# Patient Record
Sex: FEMALE | Race: WHITE | NOT HISPANIC OR LATINO | Employment: FULL TIME | URBAN - METROPOLITAN AREA
[De-identification: names, ages, dates, MRNs, and addresses within clinical notes are randomized per-mention and may not be internally consistent; named-entity substitution may affect disease eponyms.]

---

## 2019-07-19 ENCOUNTER — HOSPITAL ENCOUNTER (OUTPATIENT)
Dept: ULTRASOUND IMAGING | Facility: HOSPITAL | Age: 54
Discharge: HOME | End: 2019-07-19

## 2019-09-10 ENCOUNTER — OFFICE VISIT CONVERTED (OUTPATIENT)
Dept: SURGERY | Facility: CLINIC | Age: 54
End: 2019-09-10
Attending: SURGERY

## 2020-02-27 ENCOUNTER — CONVERSION ENCOUNTER (OUTPATIENT)
Dept: FAMILY MEDICINE CLINIC | Facility: CLINIC | Age: 55
End: 2020-02-27

## 2020-02-27 ENCOUNTER — OFFICE VISIT CONVERTED (OUTPATIENT)
Dept: FAMILY MEDICINE CLINIC | Facility: CLINIC | Age: 55
End: 2020-02-27
Attending: INTERNAL MEDICINE

## 2020-07-14 ENCOUNTER — OFFICE VISIT CONVERTED (OUTPATIENT)
Dept: FAMILY MEDICINE CLINIC | Facility: CLINIC | Age: 55
End: 2020-07-14
Attending: NURSE PRACTITIONER

## 2020-09-16 ENCOUNTER — CONVERSION ENCOUNTER (OUTPATIENT)
Dept: FAMILY MEDICINE CLINIC | Facility: CLINIC | Age: 55
End: 2020-09-16

## 2020-09-16 ENCOUNTER — OFFICE VISIT CONVERTED (OUTPATIENT)
Dept: FAMILY MEDICINE CLINIC | Facility: CLINIC | Age: 55
End: 2020-09-16
Attending: NURSE PRACTITIONER

## 2020-10-16 ENCOUNTER — HOSPITAL ENCOUNTER (OUTPATIENT)
Dept: LAB | Facility: HOSPITAL | Age: 55
Discharge: HOME | End: 2020-10-16

## 2020-10-16 LAB
APPEARANCE UR: ABNORMAL
BILIRUB UR QL: NEGATIVE
COLOR UR: YELLOW
CONV BACTERIA: ABNORMAL
CONV COLLECTION SOURCE (UA): ABNORMAL
CONV HYALINE CASTS IN URINE MICRO: ABNORMAL /[LPF]
CONV UROBILINOGEN IN URINE BY AUTOMATED TEST STRIP: 0.2 {EHRLICHU}/DL (ref 0.1–1)
GLUCOSE UR QL: NEGATIVE MG/DL
HGB UR QL STRIP: NEGATIVE
KETONES UR QL STRIP: NEGATIVE MG/DL
LEUKOCYTE ESTERASE UR QL STRIP: ABNORMAL
NITRITE UR QL STRIP: NEGATIVE
PH UR STRIP.AUTO: 5 [PH] (ref 5–8)
PROT UR QL: NEGATIVE MG/DL
RBC #/AREA URNS HPF: ABNORMAL /[HPF]
SP GR UR: 1.02 (ref 1–1.03)
SQUAMOUS SPT QL MICRO: ABNORMAL /[HPF]
WBC #/AREA URNS HPF: ABNORMAL /[HPF]

## 2020-10-19 LAB
AMPICILLIN SUSC ISLT: <=2
AMPICILLIN+SULBAC SUSC ISLT: <=2
BACTERIA UR CULT: ABNORMAL
CEFAZOLIN SUSC ISLT: <=4
CEFEPIME SUSC ISLT: <=0.12
CEFTAZIDIME SUSC ISLT: <=1
CEFTRIAXONE SUSC ISLT: <=0.25
CIPROFLOXACIN SUSC ISLT: <=0.25
ERTAPENEM SUSC ISLT: <=0.12
GENTAMICIN SUSC ISLT: <=1
LEVOFLOXACIN SUSC ISLT: <=0.12
NITROFURANTOIN SUSC ISLT: 128
PIP+TAZO SUSC ISLT: <=4
TMP SMX SUSC ISLT: <=20
TOBRAMYCIN SUSC ISLT: <=1

## 2020-10-26 ENCOUNTER — OFFICE VISIT CONVERTED (OUTPATIENT)
Dept: FAMILY MEDICINE CLINIC | Facility: CLINIC | Age: 55
End: 2020-10-26
Attending: NURSE PRACTITIONER

## 2020-10-26 ENCOUNTER — HOSPITAL ENCOUNTER (OUTPATIENT)
Dept: GENERAL RADIOLOGY | Facility: HOSPITAL | Age: 55
Discharge: HOME | End: 2020-10-26
Attending: NURSE PRACTITIONER

## 2021-02-01 ENCOUNTER — TELEMEDICINE CONVERTED (OUTPATIENT)
Dept: FAMILY MEDICINE CLINIC | Facility: CLINIC | Age: 56
End: 2021-02-01
Attending: NURSE PRACTITIONER

## 2021-05-13 NOTE — PROGRESS NOTES
Progress Note      Patient Name: Elias Baron   Patient ID: [id number]   Sex: Female   YOB: 1965    Primary Care Provider: Vi GALICIA   Referring Provider: Vi GALICIA    Visit Date: September 16, 2020    Provider: FRIDA Cifuentes   Location: [facility]   Location Address: [address] [place] [address], [address]  [place], [place]  [id number]   Location Phone: [phone]          Chief Complaint  · Patient is here with a burn on finger.      History Of Present Illness  Elias Baron is a 55 year old /White female who presents for evaluation and treatment of:      Patient is here for a burn on right index finger.  She burnt her hand in the oven, she is currently putting silvedene, neosporin on wound. This happened 4 days ago and is very painful. She is concerned because she is unable to keep it covered and it is starting to turn red.     Patient also c/o indegestion, radiating back pain when she takes her Metformin at night time. She has tried to take tums to help but it interacted with her synthroid. [facility] She does have an upcoming appt with them to discuss. She is just wondering about taking TUMS bc of the Levothyroxine she is taking.            Past Medical History  Disease Name Date Onset Notes   Allergic rhinitis, chronic --  --    Breast lump --  --    Cancer --  --    High blood pressure --  --    Hypothyroidism --  --    Limb Pain --  --    Limb Swelling --  --    Thyroid disorder --  --          Past Surgical History  Procedure Name Date Notes   *Metal Implant --  --    Artificial Joints/Limbs --  --    Cesarian Section --  --    Colonoscopy 2016 --    Hysterectomy-Abdominal --  --    Joint Surgery 2006 and 2007 --    Tubal ligation 1992 --          Medication List  Name Date Started Instructions   losartan 25 mg oral tablet 07/14/2020 take 3 tablets by oral route once a day (at bedtime) for 90 days   metformin 500 mg oral tablet   take 1 tablet (500 mg) by oral route 2 times per day with morning and evening meals   Synthroid 75 mcg oral tablet  take 1 tablet (75 mcg) by oral route once daily on an empty stomach 30 minutes before breakfast   vitamin B12-folic acid 500-400 mcg oral tablet  take 1 tablet by oral route every other day         Allergy List  Allergen Name Date Reaction Notes   Augmentin --  --  --    Cipro --  --  --    Demerol --  --  --        Allergies Reconciled  Family Medical History  Disease Name Relative/Age Notes   Heart Disease Brother/   Brother   Diabetes, unspecified type Grandfather (maternal)/   Grandfather (maternal)  Father  Father; Grandfather (maternal)   Colon Cancer  --    Skin Carcinoma Grandfather (maternal)/  Grandmother (maternal)/   --          Reproductive History  Menstrual   Age Menarche: 10   Pregnancy Summary   Total Pregnancies: 2 Full Term: 2 Premature: 0   Ab Induced: 0 Ab Spontaneous: 0 Ectopics: 0   Multiples: 0 Livin         Social History  Finding Status Start/Stop Quantity Notes   Alcohol Never --/-- --  drinks no   Alcohol Use Never --/-- --  does not drink   Caffeine Current - status unknown --/-- --  drinks tea and soft drinks  drinks occasionally; tea and soft drinks; 3-4 times per day   lives with spouse --  --/-- --  --    . --  --/-- --  --    Recreational Drug Use Never --/-- --  no   Second hand smoke exposure Never --/-- --  no   Tobacco Never --/-- --  never a smoker  never smoker  never a smoker   Working --  --/-- --  --          Immunizations  NameDate Admin Mfg Trade Name Lot Number Route Inj VIS Given VIS Publication   [id number] SKB Fluzone Quadrivalent  NE NE 10/01/2019    Comments:          Review of Systems  · Constitutional  o Denies  o : fatigue, night sweats  · Eyes  o Denies  o : double vision, blurred vision  · HENT  o Denies  o : vertigo, recent head injury  · Breasts  o Denies  o : abnormal changes in breast size, additional breast symptoms  "except as noted in the HPI  · Cardiovascular  o Denies  o : chest pain, irregular heart beats  · Respiratory  o Denies  o : shortness of breath, productive cough  · Gastrointestinal  o Admits  o : dyspepsia  o Denies  o : nausea, vomiting  · Genitourinary  o Denies  o : dysuria, urinary retention  · Integument  o Admits  o : right index finger lesion  o Denies  o : hair growth changes  · Neurologic  o Denies  o : altered mental status, seizures  · Musculoskeletal  o Denies  o : joint swelling, limitation of motion  · Endocrine  o Denies  o : cold intolerance, heat intolerance  · Heme-Lymph  o Denies  o : petechiae, lymph node enlargement or tenderness  · Allergic-Immunologic  o Denies  o : frequent illnesses      Vitals  Date Time BP Position Site L\R Cuff Size HR RR TEMP (F) WT  HT  BMI kg/m2 BSA m2 O2 Sat HC       09/16/2020 09:01 /74 Sitting      98.3 249lbs 2oz 5'  7\" 39.02 2.31 99 %          Physical Examination  · Constitutional  o Appearance  o : well developed, well-nourished, no acute distress  · Head and Face  o Head  o : normocephalic, atraumatic  · Respiratory  o Respiratory Effort  o : breathing unlabored  o Inspection of Chest  o : chest rise symmetric bilaterally  o Auscultation of Lungs  o : clear to auscultation bilaterally throughout inspiration and expiration  · Cardiovascular  o Heart  o :   § Auscultation of Heart  § : regular rate and rhythm, no murmurs, gallops or rubs  o Peripheral Vascular System  o :   § Extremities  § : no edema  · Psychiatric  o Mood and Affect  o : mood normal, affect appropriate     musculoskeletal-right index finger between 2nd and 3rd knuckle with lesion, 0.5-1cm scabbed over with area of erythema surrounding lesion. Very TTP.               Assessment  · Burn     949.0/T30.0  Keflex 500mg po bid for 10 days, cont. silvadene, d/c neosporin  · Dyspepsia     536.8/R10.13  discussed with pt need to contact [facility] provider regarding unfavorable side effects to Metformin " as there are other options she could take    Problems Reconciled  Plan  · Orders  o ACO-39: Current medications updated and reviewed () - - 09/16/2020  o ACO-14: Influenza immunization administered or previously received () - - 09/16/2020  · Medications  o Keflex 500 mg oral capsule   SIG: take 1 capsule (500 mg) by oral route every 12 hours for 10 days   DISP: (20) capsules with 0 refills  Prescribed on 09/16/2020     o Medications have been Reconciled  o Transition of Care or Provider Policy  · Instructions  o Take all medications as prescribed/directed.  o Patient was educated/instructed on their diagnosis, treatment and medications prior to discharge from the clinic today.  o Call the office with any concerns or questions.  o Electronically Identified Patient Education Materials Provided Electronically  · Disposition  o Call or Return if symptoms worsen or persist.  o Follow Up PRN            Electronically Signed by: FRIDA Cifuentes -Author on September 16, 2020 09:39:37 AM

## 2021-05-13 NOTE — PROGRESS NOTES
Progress Note      Patient Name: Elias Baron   Patient ID: [id number]   Sex: Female   YOB: 1965    Primary Care Provider: Vi GALICIA   Referring Provider: Vi GALICIA    Visit Date: July 14, 2020    Provider: FRIDA Cifuentes   Location: [facility]   Location Address: [address] [place] [address], [address]  [place], [place]  [id number]   Location Phone: [phone]          Chief Complaint  · Pt here c/o right ear pressure.      History Of Present Illness  Elias Baron is a 55 year old /White female who presents for evaluation and treatment of:      Pt here c/o right ear pressure X 5 days.    All med dosages have changed since here last, facesheet updated.    [facility] HTN, Hypothyroid, and diabetes.     She states she feels like the right ear 'has a heartbeat in it'. She did have some leftover amoxicillin and took two 500mg tablets on 7/13. She also took OTC claritin. Denies any fever, cough, sore throat, runny nose. No problems with the left ear.       Past Medical History  Disease Name Date Onset Notes   Allergic rhinitis, chronic --  --    Breast lump --  --    Cancer --  --    High blood pressure --  --    Hypothyroidism --  --    Limb Pain --  --    Limb Swelling --  --    Thyroid disorder --  --          Past Surgical History  Procedure Name Date Notes   *Metal Implant --  --    Artificial Joints/Limbs --  --    Cesarian Section --  --    Colonoscopy 2016 --    Hysterectomy-Abdominal --  --    Joint Surgery 2006 and 2007 --    Tubal ligation 1992 --          Medication List  Name Date Started Instructions   losartan 25 mg oral tablet 07/14/2020 take 3 tablets by oral route once a day (at bedtime) for 90 days   metformin 500 mg oral tablet  take 1 tablet (500 mg) by oral route 2 times per day with morning and evening meals   Synthroid 75 mcg oral tablet  take 1 tablet (75 mcg) by oral route once daily on an empty stomach 30 minutes  before breakfast   vitamin B12-folic acid 500-400 mcg oral tablet  take 1 tablet by oral route every other day         Allergy List  Allergen Name Date Reaction Notes   Augmentin --  --  --    Cipro --  --  --    Demerol --  --  --        Allergies Reconciled  Family Medical History  Disease Name Relative/Age Notes   Heart Disease Brother/   Brother   Diabetes, unspecified type Grandfather (maternal)/   Grandfather (maternal)  Father  Father; Grandfather (maternal)   Colon Cancer  --    Skin Carcinoma Grandfather (maternal)/  Grandmother (maternal)/   --          Reproductive History  Menstrual   Age Menarche: 10   Pregnancy Summary   Total Pregnancies: 2 Full Term: 2 Premature: 0   Ab Induced: 0 Ab Spontaneous: 0 Ectopics: 0   Multiples: 0 Livin         Social History  Finding Status Start/Stop Quantity Notes   Alcohol Never --/-- --  drinks no   Alcohol Use Never --/-- --  does not drink   Caffeine Current - status unknown --/-- --  drinks tea and soft drinks  drinks occasionally; tea and soft drinks; 3-4 times per day   lives with spouse --  --/-- --  --    . --  --/-- --  --    Recreational Drug Use Never --/-- --  no   Second hand smoke exposure Never --/-- --  no   Tobacco Never --/-- --  never a smoker  never smoker  never a smoker   Working --  --/-- --  --          Immunizations  NameDate Admin Mfg Trade Name Lot Number Route Inj VIS Given VIS Publication   [id number] SKL[employer]NA Fluzone Quadrivalent  NE NE 10/01/2019    Comments:          Review of Systems  · Constitutional  o Denies  o : fatigue, night sweats  · Eyes  o Denies  o : double vision, blurred vision  · HENT  o Admits  o : ear pain, ear fullness  o Denies  o : vertigo, recent head injury  · Breasts  o Denies  o : abnormal changes in breast size, additional breast symptoms except as noted in the HPI  · Cardiovascular  o Denies  o : chest pain, irregular heart beats  · Respiratory  o Denies  o : shortness of breath, productive  "cough  · Gastrointestinal  o Denies  o : nausea, vomiting  · Genitourinary  o Denies  o : dysuria, urinary retention  · Integument  o Denies  o : hair growth change, new skin lesions  · Neurologic  o Denies  o : altered mental status, seizures  · Musculoskeletal  o Denies  o : joint swelling, limitation of motion  · Endocrine  o Denies  o : cold intolerance, heat intolerance  · Heme-Lymph  o Denies  o : petechiae, lymph node enlargement or tenderness  · Allergic-Immunologic  o Denies  o : frequent illnesses      Vitals  Date Time BP Position Site L\R Cuff Size HR RR TEMP (F) WT  HT  BMI kg/m2 BSA m2 O2 Sat        07/14/2020 08:54 /86 Sitting    96 - R  97.6 251lbs 16oz 5'  7\" 39.47 2.32 97 %          Physical Examination  · Constitutional  o Appearance  o : well developed, well-nourished, no acute distress  · Head and Face  o Head  o : normocephalic, atraumatic  · Ears, Nose, Mouth and Throat  o Ears  o :   § External Ears  § : external auditory canal appearance normal, no discharge present  § Otoscopic Examination  § : tympanic membrane appearance within normal limits bilaterally  o Nose  o :   § External Nose  § : no lesions noted  § Nasopharynx  § : no discharge present  o Oral Cavity  o :   § Oral Mucosa  § : oral mucosa light pink  o Throat  o :   § Oropharynx  § : tonsils without exudate, no palatal petechiae  · Neck  o Inspection/Palpation  o : normal appearance, no masses or tenderness, trachea midline  o Thyroid  o : gland size normal, nontender, no nodules or masses present on palpation  · Respiratory  o Respiratory Effort  o : breathing unlabored  o Inspection of Chest  o : chest rise symmetric bilaterally  o Auscultation of Lungs  o : clear to auscultation bilaterally throughout inspiration and expiration  · Cardiovascular  o Heart  o :   § Auscultation of Heart  § : regular rate and rhythm, no murmurs, gallops or rubs  o Peripheral Vascular System  o :   § Extremities  § : no " edema  · Lymphatic  o Neck  o : no cervical lymphadenopathy, no supraclavicular lymphadenopathy  · Psychiatric  o Mood and Affect  o : mood normal, affect appropriate          Assessment  · Ear pain, right     388.70/H92.01  Kenalog 40mg IM, recommend daily Claritin 10mg    Problems Reconciled  Plan  · Orders  o IM/SQ - Injection Fee [facility] (14422) - 388.70/H92.01 - 07/14/2020  o ACO-39: Current medications updated and reviewed () - - 07/14/2020  o ACO-19: Colorectal cancer screening results documented and reviewed (3017F) - - 07/14/2020  o 4.00 - Kenalog Injection 40mg (-6) - 388.70/H92.01 - 07/14/2020   Injection - Kenalog 40 mg; Dose: 40 mg; Site: Right Gluteus; Route: intramuscular; Date: 07/14/2020 09:30:21; Exp: 11/21/2020; Lot: [id number]; Mfg: [employer]; TradeName: triamcinolone; Location: [facility]; Administered By: Yary Duarte LPN; Comment: pt lma well advised to wait 15min post inj  · Medications  o Medications have been Reconciled  o Transition of Care or Provider Policy  · Instructions  o Patient is taking medications as prescribed and doing well.   o Patient was educated/instructed on their diagnosis, treatment and medications prior to discharge from the clinic today.  o Call the office with any concerns or questions.  o Advised to have [facility]work and [facility] to our [facility], pt given fax number to send results.  · Disposition  o Call or Return if symptoms worsen or persist.            Electronically Signed by: Vi Tinoco APRN -Author on July 14, 2020 10:07:25 AM

## 2021-05-13 NOTE — PROGRESS NOTES
Progress Note      Patient Name: Elias Baron   Patient ID: [id number]   Sex: Female   YOB: 1965    Primary Care Provider: Vi GALICIA   Referring Provider: Vi GALICIA    Visit Date: October 26, 2020    Provider: FRIDA Cifuentes   Location[facility]   Location Address: [address] [place] [address], [address]  [place], [place]  [id number]   Location Phone: [phone]          Chief Complaint  · follow up on pain in between shoulder blades      History Of Present Illness  Elias Baron is a 55 year old /White female who presents for evaluation and treatment of:      Patient is here today for a follow up on pain in between her shoulder blades. patient states that she believes this is indigestion. She finds relief with Pepcid. She has been put back on her Metformin XR instead of the immediate release [facility].  She has been back on the XR for about a month and is feeling  better. She is still taking pepcid daily.     Patient states that she has also had neck pain for the last 2 weeks. Patient states that she thought she slept on it wrong but the pain has not gone away. It is on her left side, more in the frontal area. She states she only feels the pain when she pushes on it. Denies any trouble swallowing.       Past Medical History  Disease Name Date Onset Notes   Allergic rhinitis, chronic --  --    Breast lump --  --    Cancer --  --    High blood pressure --  --    Hypothyroidism --  --    Limb Pain --  --    Limb Swelling --  --    Thyroid disorder --  --          Past Surgical History  Procedure Name Date Notes   *Metal Implant --  --    Artificial Joints/Limbs --  --    Cesarian Section --  --    Colonoscopy 2016 --    Hysterectomy-Abdominal --  --    Joint Surgery 2006 and 2007 --    Tubal ligation 1992 --          Medication List  Name Date Started Instructions   Keflex 500 mg oral capsule 09/16/2020 take 1 capsule (500 mg) by oral route every 12 hours for  10 days   losartan 25 mg oral tablet 2020 take 3 tablets by oral route once a day (at bedtime) for 90 days   metformin 500 mg oral tablet  take 1 tablet (500 mg) by oral route 2 times per day with morning and evening meals   Synthroid 75 mcg oral tablet  take 1 tablet (75 mcg) by oral route once daily on an empty stomach 30 minutes before breakfast   vitamin B12-folic acid 500-400 mcg oral tablet  take 1 tablet by oral route every other day         Allergy List  Allergen Name Date Reaction Notes   Augmentin --  --  --    Cipro --  --  --    Demerol --  --  --        Allergies Reconciled  Family Medical History  Disease Name Relative/Age Notes   Heart Disease Brother/   Brother   Diabetes, unspecified type Grandfather (maternal)/   Grandfather (maternal)  Father  Father; Grandfather (maternal)   Colon Cancer  --    Skin Carcinoma Grandfather (maternal)/  Grandmother (maternal)/   --          Reproductive History  Menstrual   Age Menarche: 10   Pregnancy Summary   Total Pregnancies: 2 Full Term: 2 Premature: 0   Ab Induced: 0 Ab Spontaneous: 0 Ectopics: 0   Multiples: 0 Livin         Social History  Finding Status Start/Stop Quantity Notes   Alcohol Never --/-- --  drinks no   Alcohol Use Never --/-- --  does not drink   Caffeine Current - status unknown --/-- --  drinks tea and soft drinks  drinks occasionally; tea and soft drinks; 3-4 times per day   lives with spouse --  --/-- --  --    . --  --/-- --  --    Recreational Drug Use Never --/-- --  no   Second hand smoke exposure Never --/-- --  no   Tobacco Never --/-- --  never a smoker  never smoker  never a smoker   Working --  --/-- --  --          Immunizations  NameDate Admin Mfg Trade Name Lot Number Route Inj VIS Given VIS Publication   [id number] SKB Fluzone Quadrivalent  NE NE 10/01/2019    Comments:          Review of Systems  · Constitutional  o Denies  o : fatigue  · Eyes  o Denies  o : blurred vision, changes in  "vision  · HENT  o Denies  o : headaches  · Cardiovascular  o Denies  o : chest pain, irregular heart beats, rapid heart rate, dyspnea on exertion  · Respiratory  o Denies  o : shortness of breath, wheezing, cough  · Gastrointestinal  o Denies  o : nausea, vomiting, diarrhea, constipation, abdominal pain, blood in stools, melena  · Genitourinary  o Denies  o : frequency, dysuria, hematuria  · Integument  o Denies  o : rash, new skin lesions  · Musculoskeletal  o Admits  o : neck pain  o Denies  o : joint pain, joint swelling, muscle pain  · Endocrine  o Denies  o : polyuria, polydipsia      Vitals  Date Time BP Position Site L\R Cuff Size HR RR TEMP (F) WT  HT  BMI kg/m2 BSA m2 O2 Sat FR L/min FiO2 HC       10/26/2020 12:40 /84 Sitting    93 - R   249lbs 0oz 5'  7\" 39 2.31 99 %  21%          Physical Examination  · Constitutional  o Appearance  o : well developed, well-nourished, no acute distress  · Head and Face  o Head  o : normocephalic, atraumatic  · Neck  o Inspection/Palpation  o : normal appearance, no masses or tenderness, trachea midline  o Thyroid  o : gland size normal, nontender, no nodules or masses present on palpation  · Respiratory  o Respiratory Effort  o : breathing unlabored  o Inspection of Chest  o : chest rise symmetric bilaterally  o Auscultation of Lungs  o : clear to auscultation bilaterally throughout inspiration and expiration  · Cardiovascular  o Heart  o :   § Auscultation of Heart  § : regular rate and rhythm, no murmurs, gallops or rubs  o Peripheral Vascular System  o :   § Extremities  § : no edema  · Lymphatic  o Neck  o : no cervical lymphadenopathy, no supraclavicular lymphadenopathy  · Psychiatric  o Mood and Affect  o : mood normal, affect appropriate     neck exam, TTP on left side of neck, no edema, no carotid bruit audible           Assessment  · GERD (gastroesophageal reflux disease)     530.81/K21.9  · Neck pain     723.1/M54.2    Problems " Reconciled  Plan  · Orders  o ACO-39: Current medications updated and reviewed (1159F, ) - - 10/26/2020  o Xray soft tissue neck [facility] (37911) - 723.1/M54.2 - 10/26/2020  o Carotid Doppler Bilateral [facility] (16927) - 723.1/M54.2 - 10/26/2020  · Medications  o omeprazole 20 mg oral capsule,delayed release(DR/EC)   SIG: take 1 capsule (20 mg) by oral route once daily before a meal for 30 days   DISP: (30) Capsule with 2 refills  Prescribed on 10/26/2020     o Medications have been Reconciled  o Transition of Care or Provider Policy  · Instructions  o Take all medications as prescribed/directed.  o Patient was educated/instructed on their diagnosis, treatment and medications prior to discharge from the clinic today.  o Patient instructed to seek medical attention urgently for new or worsening symptoms.  o Call the office with any concerns or questions.  o Electronically Identified Patient Education Materials Provided Electronically  · Disposition  o Call or Return if symptoms worsen or persist.  o Follow Up PRN            Electronically Signed by: FRIDA Cifuentes -Author on October 27, 2020 07:31:37 AM

## 2021-05-14 VITALS
OXYGEN SATURATION: 99 % | BODY MASS INDEX: 39.1 KG/M2 | SYSTOLIC BLOOD PRESSURE: 131 MMHG | WEIGHT: 249.12 LBS | DIASTOLIC BLOOD PRESSURE: 74 MMHG | HEIGHT: 67 IN | TEMPERATURE: 98.3 F

## 2021-05-14 VITALS
WEIGHT: 249 LBS | SYSTOLIC BLOOD PRESSURE: 142 MMHG | BODY MASS INDEX: 39.08 KG/M2 | HEIGHT: 67 IN | HEART RATE: 93 BPM | DIASTOLIC BLOOD PRESSURE: 84 MMHG | OXYGEN SATURATION: 99 %

## 2021-05-14 NOTE — PROGRESS NOTES
Progress Note      Patient Name: Elias Baron   Patient ID: [id number]   Sex: Female   YOB: 1965    Primary Care Provider: Vi GALICIA   Referring Provider: Vi GALICIA    Visit Date: February 1, 2021    Provider: FRIDA Cifuentes   Location: [facility]   Location Address: [address] [place] [address], [address]  [place], [place]  [id number]   Location Phone: [phone]          Chief Complaint  · ear pain- left ear  · cough      History Of Present Illness  Elias Baron is a 55 year old /White female who presents for evaluation and treatment of:   Video Conferencing Visit  Elias Baron is a 55 year old /White female who is presenting for evaluation via video conferencing via [employer]. Verbal consent obtained before beginning visit.   The following staff were present during this visit: Vi Tinoco      Patient states that she is having a telehealth visit today vis [employer] because of left sided ear pain. Patient states that she has a cough with this as well. Patient states that she has done OTC medicines for relief but states the ear pain is still present. Patient states that she has had a sinus headache but it has subsided. Pt has used some peroxide solution in her ear with good results. She is able to lay on her ear, but does report some tenderness at the base of the ear. She denies any drainage from the ear. The ear pain has been 3 days and the cough has been longer. She reports the cough is worse at hs. she denies any shortness of breath. She denies any wheezing. pt feels the cough is coming from her esophageal area, not her lungs. She occasionally has drainage from her nose, she has taken bret-seltzer with good relief.       Past Medical History  Disease Name Date Onset Notes   Allergic rhinitis, chronic --  --    Breast lump --  --    Cancer --  --    High blood pressure --  --    Hypothyroidism --  --    Limb Pain --  --    Limb Swelling --  --     Thyroid disorder --  --          Past Surgical History  Procedure Name Date Notes   *Metal Implant --  --    Artificial Joints/Limbs --  --    Cesarian Section --  --    Colonoscopy  --    Hysterectomy-Abdominal --  --    Joint Surgery  and  --    Tubal ligation  --          Medication List  Name Date Started Instructions   losartan 25 mg oral tablet 2020 take 3 tablets by oral route once a day (at bedtime) for 90 days   metformin 500 mg oral tablet  take 1 tablet (500 mg) by oral route 2 times per day with morning and evening meals   omeprazole 20 mg oral capsule,delayed release(DR/EC) 10/26/2020 take 1 capsule (20 mg) by oral route once daily before a meal for 30 days   Synthroid 75 mcg oral tablet  take 1 tablet (75 mcg) by oral route once daily on an empty stomach 30 minutes before breakfast   vitamin B12-folic acid 500-400 mcg oral tablet  take 1 tablet by oral route every other day         Allergy List  Allergen Name Date Reaction Notes   Augmentin --  --  --    Cipro --  --  --    Demerol --  --  --        Allergies Reconciled  Family Medical History  Disease Name Relative/Age Notes   Heart Disease Brother/   Brother   Diabetes, unspecified type Grandfather (maternal)/   Grandfather (maternal)  Father  Father; Grandfather (maternal)   Colon Cancer  --    Skin Carcinoma Grandfather (maternal)/  Grandmother (maternal)/   --          Reproductive History  Menstrual   Age Menarche: 10   Pregnancy Summary   Total Pregnancies: 2 Full Term: 2 Premature: 0   Ab Induced: 0 Ab Spontaneous: 0 Ectopics: 0   Multiples: 0 Livin         Social History  Finding Status Start/Stop Quantity Notes   Alcohol Never --/-- --  drinks no   Alcohol Use Never --/-- --  does not drink   Caffeine Current - status unknown --/-- --  drinks tea and soft drinks  drinks occasionally; tea and soft drinks; 3-4 times per day   lives with spouse --  --/-- --  --    . --  --/-- --  --    Recreational Drug Use  Never --/-- --  no   Second hand smoke exposure Never --/-- --  no   Tobacco Never --/-- --  never a smoker  never smoker  never a smoker   Working --  --/-- --  --          Immunizations  NameDate Admin Mfg Trade Name Lot Number Route Inj VIS Given VIS Publication   [id number]/01/2019 SKB Fluzone Quadrivalent  NE NE 10/01/2019    Comments:          Review of Systems  · Constitutional  o Denies  o : fatigue  · Eyes  o Denies  o : blurred vision, changes in vision  · HENT  o Denies  o : headaches  · Cardiovascular  o Denies  o : chest pain, irregular heart beats, rapid heart rate, dyspnea on exertion  · Respiratory  o Admits  o : cough, dry cough  o Denies  o : shortness of breath, wheezing  · Gastrointestinal  o Denies  o : nausea, vomiting, diarrhea, constipation, abdominal pain, blood in stools, melena  · Genitourinary  o Denies  o : frequency, dysuria, hematuria  · Integument  o Denies  o : rash, new skin lesions  · Musculoskeletal  o Denies  o : joint pain, joint swelling, muscle pain  · Endocrine  o Denies  o : polyuria, polydipsia      Physical Examination  · Constitutional  o Appearance  o : well developed, well-nourished, no acute distress  · Head and Face  o Head  o : normocephalic, atraumatic  · Respiratory  o Respiratory Effort  o : breathing unlabored  o Inspection of Chest  o : chest rise symmetric bilaterally  · Psychiatric  o Mood and Affect  o : mood normal, affect appropriate          Assessment  · Cough     786.2/R05  · Sinusitis, acute     461.9/J01.90  · Ear pain, left     388.70/H92.02    Problems Reconciled  Plan  · Orders  o ACO-14: Influenza immunization administered or previously received [facility] () - - 02/01/2021  o ACO-39: Current medications updated and reviewed (1159F, ) - - 02/01/2021  · Medications  o Tessalon Perles 100 mg oral capsule   SIG: take 2 capsules (200 mg) by oral route 3 times per day for 10 days   DISP: (30) Capsule with 0 refills  Prescribed on 02/01/2021      o azithromycin 250 mg oral tablet   SIG: take 2 tablets (500 mg) by oral route once daily for 1 day then 1 tablet (250 mg) by oral route once daily for 4 days   DISP: (6) Tablet with 0 refills  Prescribed on 02/01/2021     o Keflex 500 mg oral capsule   SIG: take 1 capsule (500 mg) by oral route every 12 hours for 10 days   DISP: (20) capsules with 0 refills  Discontinued on 02/01/2021     o Medications have been Reconciled  o Transition of Care or Provider Policy  · Instructions  o Take all medications as prescribed/directed.  o Patient was educated/instructed on their diagnosis, treatment and medications prior to discharge from the clinic today.  o Patient instructed to seek medical attention urgently for new or worsening symptoms.  o Call the office with any concerns or questions.  o Discussed Covid-19 precautions including, but not limited to, social distancing, avoid touching your face, and hand washing.   o Electronically Identified Patient Education Materials Provided Electronically  · Disposition  o Call or Return if symptoms worsen or persist.  o Follow Up PRN            Electronically Signed by: FRIDA Cifuentes -Author on February 1, 2021 01:59:13 PM

## 2021-05-15 VITALS
HEIGHT: 67 IN | BODY MASS INDEX: 40.2 KG/M2 | HEART RATE: 100 BPM | OXYGEN SATURATION: 97 % | SYSTOLIC BLOOD PRESSURE: 145 MMHG | WEIGHT: 256.12 LBS | DIASTOLIC BLOOD PRESSURE: 78 MMHG

## 2021-05-15 VITALS
HEIGHT: 67 IN | DIASTOLIC BLOOD PRESSURE: 86 MMHG | WEIGHT: 252 LBS | OXYGEN SATURATION: 97 % | HEART RATE: 96 BPM | SYSTOLIC BLOOD PRESSURE: 137 MMHG | BODY MASS INDEX: 39.55 KG/M2 | TEMPERATURE: 97.6 F

## 2021-05-15 VITALS — HEIGHT: 67 IN | WEIGHT: 252.25 LBS | BODY MASS INDEX: 39.59 KG/M2 | RESPIRATION RATE: 16 BRPM

## 2021-10-05 RX ORDER — CELECOXIB 200 MG/1
CAPSULE ORAL
COMMUNITY

## 2021-10-05 RX ORDER — LOSARTAN POTASSIUM 50 MG/1
TABLET ORAL
COMMUNITY
End: 2022-02-17

## 2021-10-05 RX ORDER — LEVOTHYROXINE SODIUM 0.07 MG/1
TABLET ORAL
COMMUNITY

## 2021-10-05 RX ORDER — LEVOTHYROXINE SODIUM 0.05 MG/1
TABLET ORAL
COMMUNITY
End: 2021-12-20 | Stop reason: ALTCHOICE

## 2021-10-08 ENCOUNTER — OFFICE VISIT (OUTPATIENT)
Dept: SURGERY | Facility: CLINIC | Age: 56
End: 2021-10-08

## 2021-10-08 VITALS — HEIGHT: 67 IN | WEIGHT: 245.4 LBS | BODY MASS INDEX: 38.52 KG/M2 | RESPIRATION RATE: 14 BRPM

## 2021-10-08 DIAGNOSIS — N60.01 BENIGN BREAST CYST IN FEMALE, RIGHT: Primary | ICD-10-CM

## 2021-10-08 PROCEDURE — 99213 OFFICE O/P EST LOW 20 MIN: CPT | Performed by: SURGERY

## 2021-10-08 RX ORDER — PREDNISONE 10 MG/1
TABLET ORAL
COMMUNITY
Start: 2021-07-13 | End: 2021-12-20

## 2021-10-08 RX ORDER — AMOXICILLIN 500 MG/1
CAPSULE ORAL
COMMUNITY
Start: 2021-10-01 | End: 2021-12-20

## 2021-10-08 RX ORDER — FLUTICASONE PROPIONATE 50 MCG
1 SPRAY, SUSPENSION (ML) NASAL DAILY
COMMUNITY

## 2021-10-08 RX ORDER — GLIPIZIDE 5 MG/1
2.5 TABLET ORAL DAILY
COMMUNITY
End: 2021-12-20 | Stop reason: HOSPADM

## 2021-10-08 NOTE — PROGRESS NOTES
Inpatient History and Physical Surgical Orders    Preadmission Location:   Preadmission Time:  Facility:  Surgery Date:  Surgery Time:  Preadmission Test date:     Chief Complaint  Outpatient History and Physical / Surgical Orders    Primary Care Provider: Vi Tinoco APRN    Referring Provider: No ref. provider found    Subjective      Patient Name: Elias Baron : 1965    HPI  The patient is a 56-year-old female who is well-known to our office.  She has had a lot of trouble with benign breast cyst through the years.  She recently had her mammogram which was normal but in the wake of that has developed a new symptomatic cyst of the right upper breast.    Past History:  Medical History: has no past medical history on file.   Surgical History: has no past surgical history on file.   Family History: family history is not on file.   Social History: reports that she has quit smoking. She has never used smokeless tobacco.  Allergies: Amoxicillin-pot clavulanate, Ciprofloxacin, Meperidine, and Nitrofurantoin       Current Outpatient Medications:   •  amoxicillin (AMOXIL) 500 MG capsule, TAKE FOUR CAPSULES BY MOUTH ONE hour prior TO dental appointment, Disp: , Rfl:   •  celecoxib (CeleBREX) 200 MG capsule, Celebrex 200 mg oral capsule take 1 capsule (200 mg) by oral route once daily   Suspended, Disp: , Rfl:   •  fluticasone (FLONASE) 50 MCG/ACT nasal spray, 1 spray into the nostril(s) as directed by provider Daily., Disp: , Rfl:   •  glipizide (GLUCOTROL) 5 MG tablet, Take 5 mg by mouth., Disp: , Rfl:   •  levothyroxine (Synthroid) 75 MCG tablet, Synthroid 75 mcg oral tablet take 1 tablet (75 mcg) by oral route once daily on an empty stomach 30 minutes before breakfast   Active, Disp: , Rfl:   •  losartan (COZAAR) 50 MG tablet, losartan 50 mg oral tablet take 1 tablet (50 mg) by oral route once daily   Suspended, Disp: , Rfl:   •  metFORMIN (GLUCOPHAGE) 500 MG tablet, Take 500 mg by mouth Daily With  "Breakfast., Disp: , Rfl:   •  Cobalamin Combinations (VITAMIN B12-FOLIC ACID PO), vitamin B12-folic acid 500-400 mcg oral tablet take 1 tablet by oral route daily   Suspended, Disp: , Rfl:   •  levothyroxine (Synthroid) 50 MCG tablet, , Disp: , Rfl:   •  LISINOPRIL PO, lisinopril oral 5mg taken at night.   Suspended, Disp: , Rfl:   •  predniSONE (DELTASONE) 10 MG tablet, TAKE 4 TABLETS BY MOUTH FOR DAY ONE, 3 TABLETS BY MOUTH FOR DAY two, 2 TABLETS FOR DAY three, 1 TABLET FOR DAY four, AND 1/2 TABLET BY MOUTH FOR DAYS five & six, Disp: , Rfl:        Objective   Vital Signs:   Resp 14   Ht 170.2 cm (67\")   Wt 111 kg (245 lb 6.4 oz)   BMI 38.44 kg/m²       Physical Exam  Vitals and nursing note reviewed.   Constitutional:       Appearance: Normal appearance. The patient is well-developed.   Cardiovascular:      Rate and Rhythm: Normal rate and regular rhythm.   Pulmonary:      Effort: Pulmonary effort is normal.      Breath sounds: Normal air entry.   Abdominal:      General: Bowel sounds are normal.      Palpations: Abdomen is soft.      Skin:     General: Skin is warm and dry.   Neurological:      Mental Status: The patient is alert and oriented to person, place, and time.      Motor: Motor function is intact.   Psychiatric:         Mood and Affect: Mood normal.   Breast: She has a palpable cyst in the right upper breast that is about 2-1/2 to 3 cm in diameter.    Result Review :               Assessment and Plan   Diagnoses and all orders for this visit:    1. Benign breast cyst in female, right (Primary)    We aspirated her breast cyst here in the office today with a 22-gauge needle.  I aspirated out about 5 mL of typical looking cyst fluid and the cyst completely disappeared fulfilling the requirements of the benign breast cyst.  I will see her back on an as-needed basis.    I  Ronni Brandt MD  10/08/2021    "

## 2021-12-20 ENCOUNTER — OFFICE VISIT (OUTPATIENT)
Dept: FAMILY MEDICINE CLINIC | Facility: CLINIC | Age: 56
End: 2021-12-20

## 2021-12-20 VITALS
HEART RATE: 90 BPM | WEIGHT: 249.8 LBS | OXYGEN SATURATION: 98 % | BODY MASS INDEX: 39.21 KG/M2 | TEMPERATURE: 98.1 F | HEIGHT: 67 IN | DIASTOLIC BLOOD PRESSURE: 66 MMHG | SYSTOLIC BLOOD PRESSURE: 130 MMHG

## 2021-12-20 DIAGNOSIS — E11.9 TYPE 2 DIABETES MELLITUS WITHOUT COMPLICATION, WITHOUT LONG-TERM CURRENT USE OF INSULIN (HCC): Primary | ICD-10-CM

## 2021-12-20 PROBLEM — E03.9 HYPOTHYROIDISM: Status: ACTIVE | Noted: 2021-12-20

## 2021-12-20 PROBLEM — I10 HYPERTENSION: Status: ACTIVE | Noted: 2021-12-20

## 2021-12-20 PROCEDURE — 99213 OFFICE O/P EST LOW 20 MIN: CPT | Performed by: NURSE PRACTITIONER

## 2021-12-20 NOTE — PROGRESS NOTES
Chief Complaint  Diabetes    Subjective          Elias Baron presents to [facility]  History of Present Illness    Diabetes Mellitus, type 2: Patient is taking Metformin, Glipizide. Patient is compliant with medications.  Patient's last A1c is 7.8% on 6/24/21. Patient monitors blood sugar at home with average readings of 140s-170s.  Patient states here blood sugars are all over the place. Patient's [facility].  Patient states she had not had a DM foot exam.  Patient denies any unhealing sores. Patient attempts to monitor carbohydrate/ sugar intake in diet. Patient would like to discuss her meds and whether or not she needs to see specialist because her blood sugars are all over the place.  [facility] and DM2 management but they are not helpful with information.    Hypothyroidism-pt currently on levothyroxine 75mcg daily and doing well with this.       Past Medical History:   Diagnosis Date   • Allergic    • Diabetes mellitus (HCC)    • Hypertension    • Hypothyroidism          Allergies   Allergen Reactions   • Amoxicillin-Pot Clavulanate Unknown - Low Severity   • Ciprofloxacin Unknown - Low Severity   • Meperidine Unknown - Low Severity   • Nitrofurantoin Unknown - Low Severity          Past Surgical History:   Procedure Laterality Date   • JOINT REPLACEMENT     • TUBAL ABDOMINAL LIGATION            Social History     Tobacco Use   • Smoking status: Never Smoker   • Smokeless tobacco: Never Used   Substance Use Topics   • Alcohol use: Never         History reviewed. No pertinent family history.       Current Outpatient Medications on File Prior to Visit   Medication Sig   • celecoxib (CeleBREX) 200 MG capsule Celebrex 200 mg oral capsule take 1 capsule (200 mg) by oral route once daily   Suspended   • fluticasone (FLONASE) 50 MCG/ACT nasal spray 1 spray into the nostril(s) as directed by provider Daily.   •  "levothyroxine (Synthroid) 75 MCG tablet Synthroid 75 mcg oral tablet take 1 tablet (75 mcg) by oral route once daily on an empty stomach 30 minutes before breakfast   Active   • losartan (COZAAR) 50 MG tablet losartan 50 mg oral tablet take 1 tablet (50 mg) by oral route once daily   Suspended     No current facility-administered medications on file prior to visit.         Immunization History   Administered Date(s) Administered   • COVID-19 (MODERNA) 1st, 2nd, 3rd Dose Only 02/08/2021, 03/08/2021, 11/19/2021   • Flu Vaccine Intradermal Quad 18-64YR 11/02/2020   • Flu Vaccine Quad PF >18YRS 10/01/2019         /66 (BP Location: Left arm, Patient Position: Sitting)   Pulse 90   Temp 98.1 °F (36.7 °C) (Oral)   Ht 170.2 cm (67\")   Wt 113 kg (249 lb 12.8 oz)   SpO2 98%   BMI 39.12 kg/m²             Physical Exam  Vitals reviewed.   Constitutional:       Appearance: Normal appearance. She is well-developed.   HENT:      Head: Normocephalic and atraumatic.      Right Ear: External ear normal.      Left Ear: External ear normal.      Mouth/Throat:      Pharynx: No oropharyngeal exudate.   Eyes:      Conjunctiva/sclera: Conjunctivae normal.      Pupils: Pupils are equal, round, and reactive to light.   Cardiovascular:      Rate and Rhythm: Normal rate and regular rhythm.      Heart sounds: No murmur heard.  No friction rub. No gallop.    Pulmonary:      Effort: Pulmonary effort is normal.      Breath sounds: Normal breath sounds. No wheezing or rhonchi.   Skin:     General: Skin is warm and dry.   Neurological:      Mental Status: She is alert and oriented to person, place, and time.      Cranial Nerves: No cranial nerve deficit.   Psychiatric:         Mood and Affect: Mood and affect normal.         Behavior: Behavior normal.         Thought Content: Thought content normal.         Judgment: Judgment normal.             Result Review :                           Assessment and Plan      Diagnoses and all orders " for this visit:    1. Type 2 diabetes mellitus without complication, without long-term current use of insulin (HCC) (Primary)  Comments:  trial of trulicity 0.75mg SC weekly, side effects and administration addressed, sample supply provided, [id number], expiration date 7/21/2023      Advised to DC the Metformin and glipizide.        Follow Up     Return if symptoms worsen or fail to improve, for Next scheduled follow up.    Patient was given instructions and counseling regarding her condition or for health maintenance advice. Please see specific information pulled into the AVS if appropriate.

## 2021-12-22 ENCOUNTER — TELEPHONE (OUTPATIENT)
Dept: FAMILY MEDICINE CLINIC | Facility: CLINIC | Age: 56
End: 2021-12-22

## 2022-01-03 ENCOUNTER — TELEPHONE (OUTPATIENT)
Dept: FAMILY MEDICINE CLINIC | Facility: CLINIC | Age: 57
End: 2022-01-03

## 2022-01-03 NOTE — TELEPHONE ENCOUNTER
Caller: Elias Baron    Relationship: Self    Best call back number: [phone]     Which medication are you concerned about: TRULICITY    Who prescribed you this medication: BERKLEY ALBARADO    What are your concerns: PATIENT STATED THAT  WON'T COVER HER TRULICITY AND SHE WANTS TO TRY TO PAY FOR IT OUT OF POCKET. SHE WANTS TO KNOW IF A SCRIPT CAN BE CALLED IN TO [employer] TO SEE HOW EXPENSIVE. HER NEXT A1C THROUGH [employer] IS MARCH 21ST SO THEY WILL CONTINUE TO MONITOR. PLEASE LET HER KNOW WHEN IT'S BEEN CALLED IN, IF ABLE.

## 2022-01-04 RX ORDER — DULAGLUTIDE 1.5 MG/.5ML
1.5 INJECTION, SOLUTION SUBCUTANEOUS WEEKLY
Qty: 4 PEN | Refills: 1 | Status: SHIPPED | OUTPATIENT
Start: 2022-01-04 | End: 2022-02-08 | Stop reason: SDUPTHER

## 2022-01-12 ENCOUNTER — OFFICE VISIT (OUTPATIENT)
Dept: FAMILY MEDICINE CLINIC | Facility: CLINIC | Age: 57
End: 2022-01-12

## 2022-01-12 VITALS
DIASTOLIC BLOOD PRESSURE: 87 MMHG | SYSTOLIC BLOOD PRESSURE: 136 MMHG | BODY MASS INDEX: 39.96 KG/M2 | OXYGEN SATURATION: 100 % | WEIGHT: 254.6 LBS | HEART RATE: 100 BPM | HEIGHT: 67 IN

## 2022-01-12 DIAGNOSIS — Z79.4 TYPE 2 DIABETES MELLITUS WITH HYPOGLYCEMIA WITHOUT COMA, WITH LONG-TERM CURRENT USE OF INSULIN: Primary | ICD-10-CM

## 2022-01-12 DIAGNOSIS — E03.9 HYPOTHYROIDISM, UNSPECIFIED TYPE: ICD-10-CM

## 2022-01-12 DIAGNOSIS — E11.649 TYPE 2 DIABETES MELLITUS WITH HYPOGLYCEMIA WITHOUT COMA, WITH LONG-TERM CURRENT USE OF INSULIN: Primary | ICD-10-CM

## 2022-01-12 PROCEDURE — 99213 OFFICE O/P EST LOW 20 MIN: CPT | Performed by: NURSE PRACTITIONER

## 2022-01-12 NOTE — PROGRESS NOTES
Chief Complaint  Diabetes (patient was started on Trulicity at her last office visit. Patient states that she has seen her glucose has been running much lower. She states that her sugars are 126-156. Patient states that she feels so much better since being on this. )    Subjective          Elias Baron presents to [facility]  History of Present Illness    Pt is following up on diabetes. She was started on trulicity about a month ago and states she is doing well. She states her blood sugars are doing well and she is feeling great. Pt recent labwork on 12/22/21 was 7.4, she is going to continue this dose and recheck her [facility] work in [facility].    Past Medical History:   Diagnosis Date   • Allergic    • Diabetes mellitus (HCC)    • Hypertension    • Hypothyroidism          Allergies   Allergen Reactions   • Amoxicillin-Pot Clavulanate Unknown - Low Severity   • Ciprofloxacin Unknown - Low Severity   • Meperidine Unknown - Low Severity   • Nitrofurantoin Unknown - Low Severity          Past Surgical History:   Procedure Laterality Date   • JOINT REPLACEMENT     • TUBAL ABDOMINAL LIGATION            Social History     Tobacco Use   • Smoking status: Never Smoker   • Smokeless tobacco: Never Used   Substance Use Topics   • Alcohol use: Never         History reviewed. No pertinent family history.       Current Outpatient Medications on File Prior to Visit   Medication Sig   • celecoxib (CeleBREX) 200 MG capsule Celebrex 200 mg oral capsule take 1 capsule (200 mg) by oral route once daily   Suspended   • Dulaglutide (Trulicity) 1.5 MG/0.5ML solution pen-injector Inject 1.5 mg under the skin into the appropriate area as directed 1 (One) Time Per Week.   • fluticasone (FLONASE) 50 MCG/ACT nasal spray 1 spray into the nostril(s) as directed by provider Daily.   • levothyroxine (Synthroid) 75 MCG tablet Synthroid 75 mcg oral tablet take 1 tablet (75 mcg) by oral route once daily  "on an empty stomach 30 minutes before breakfast   Active   • losartan (COZAAR) 50 MG tablet losartan 50 mg oral tablet take 1 tablet (50 mg) by oral route once daily   Suspended     No current facility-administered medications on file prior to visit.         Immunization History   Administered Date(s) Administered   • COVID-19 (MODERNA) 1st, 2nd, 3rd Dose Only 02/08/2021, 03/08/2021, 11/19/2021   • Flu Vaccine Intradermal Quad 18-64YR 11/02/2020   • Flu Vaccine Quad PF >18YRS 10/01/2019         /87   Pulse 100   Ht 170.2 cm (67\")   Wt 115 kg (254 lb 9.6 oz)   SpO2 100%   BMI 39.88 kg/m²             Physical Exam  Vitals reviewed.   Constitutional:       Appearance: Normal appearance. She is well-developed.   HENT:      Head: Normocephalic and atraumatic.      Right Ear: External ear normal.      Left Ear: External ear normal.      Mouth/Throat:      Pharynx: No oropharyngeal exudate.   Eyes:      Conjunctiva/sclera: Conjunctivae normal.      Pupils: Pupils are equal, round, and reactive to light.   Neck:      Vascular: No carotid bruit.   Cardiovascular:      Rate and Rhythm: Normal rate and regular rhythm.      Heart sounds: No murmur heard.  No friction rub. No gallop.    Pulmonary:      Effort: Pulmonary effort is normal.      Breath sounds: Normal breath sounds. No wheezing or rhonchi.   Skin:     General: Skin is warm and dry.   Neurological:      Mental Status: She is alert and oriented to person, place, and time.      Cranial Nerves: No cranial nerve deficit.   Psychiatric:         Mood and Affect: Mood and affect normal.         Behavior: Behavior normal.         Thought Content: Thought content normal.         Judgment: Judgment normal.             Result Review :                           Assessment and Plan      Diagnoses and all orders for this visit:    1. Type 2 diabetes mellitus with hypoglycemia without coma, with long-term current use of insulin ([facility]) (Primary)  Comments:  Doing well on " Trulicity, continue medication.  Recheck lab in 3 months and patient will bring me a copy.    2. Hypothyroidism, unspecified type  Comments:  [facility]              Follow Up     Return in about 6 months (around 7/12/2022).    Patient was given instructions and counseling regarding her condition or for health maintenance advice. Please see specific information pulled into the AVS if appropriate.

## 2022-02-08 ENCOUNTER — TELEPHONE (OUTPATIENT)
Dept: FAMILY MEDICINE CLINIC | Facility: CLINIC | Age: 57
End: 2022-02-08

## 2022-02-09 RX ORDER — DULAGLUTIDE 1.5 MG/.5ML
1.5 INJECTION, SOLUTION SUBCUTANEOUS WEEKLY
Qty: 4 PEN | Refills: 1 | Status: SHIPPED | OUTPATIENT
Start: 2022-02-09 | End: 2022-02-15 | Stop reason: SDUPTHER

## 2022-02-10 ENCOUNTER — HOSPITAL ENCOUNTER (OUTPATIENT)
Dept: CARDIOLOGY | Facility: HOSPITAL | Age: 57
Discharge: HOME | End: 2022-02-10
Admitting: NURSE PRACTITIONER

## 2022-02-10 ENCOUNTER — TRANSCRIBE ORDERS (OUTPATIENT)
Dept: ADMINISTRATIVE | Facility: HOSPITAL | Age: 57
End: 2022-02-10

## 2022-02-10 DIAGNOSIS — R60.9 EDEMA, UNSPECIFIED TYPE: ICD-10-CM

## 2022-02-10 DIAGNOSIS — R60.9 SWELLING: ICD-10-CM

## 2022-02-10 DIAGNOSIS — R23.8 LOCALIZED WARMTH OF SKIN: ICD-10-CM

## 2022-02-10 DIAGNOSIS — M79.604 PAIN OF RIGHT LOWER EXTREMITY: Primary | ICD-10-CM

## 2022-02-10 DIAGNOSIS — M79.604 PAIN OF RIGHT LOWER EXTREMITY: ICD-10-CM

## 2022-02-10 LAB
BH CV LOWER VASCULAR LEFT COMMON FEMORAL AUGMENT: NORMAL
BH CV LOWER VASCULAR LEFT COMMON FEMORAL COMPETENT: NORMAL
BH CV LOWER VASCULAR LEFT COMMON FEMORAL COMPRESS: NORMAL
BH CV LOWER VASCULAR LEFT COMMON FEMORAL PHASIC: NORMAL
BH CV LOWER VASCULAR LEFT COMMON FEMORAL SPONT: NORMAL
BH CV LOWER VASCULAR RIGHT COMMON FEMORAL AUGMENT: NORMAL
BH CV LOWER VASCULAR RIGHT COMMON FEMORAL COMPETENT: NORMAL
BH CV LOWER VASCULAR RIGHT COMMON FEMORAL COMPRESS: NORMAL
BH CV LOWER VASCULAR RIGHT COMMON FEMORAL PHASIC: NORMAL
BH CV LOWER VASCULAR RIGHT COMMON FEMORAL SPONT: NORMAL
BH CV LOWER VASCULAR RIGHT DISTAL FEMORAL COMPRESS: NORMAL
BH CV LOWER VASCULAR RIGHT GASTRONEMIUS COMPRESS: NORMAL
BH CV LOWER VASCULAR RIGHT LESSER SAPH COMPRESS: NORMAL
BH CV LOWER VASCULAR RIGHT MID FEMORAL AUGMENT: NORMAL
BH CV LOWER VASCULAR RIGHT MID FEMORAL COMPETENT: NORMAL
BH CV LOWER VASCULAR RIGHT MID FEMORAL COMPRESS: NORMAL
BH CV LOWER VASCULAR RIGHT MID FEMORAL PHASIC: NORMAL
BH CV LOWER VASCULAR RIGHT MID FEMORAL SPONT: NORMAL
BH CV LOWER VASCULAR RIGHT PERFORATOR 1 COMPRESS: NORMAL
BH CV LOWER VASCULAR RIGHT PERONEAL COMPRESS: NORMAL
BH CV LOWER VASCULAR RIGHT POPLITEAL AUGMENT: NORMAL
BH CV LOWER VASCULAR RIGHT POPLITEAL COMPETENT: NORMAL
BH CV LOWER VASCULAR RIGHT POPLITEAL COMPRESS: NORMAL
BH CV LOWER VASCULAR RIGHT POPLITEAL PHASIC: NORMAL
BH CV LOWER VASCULAR RIGHT POPLITEAL SPONT: NORMAL
BH CV LOWER VASCULAR RIGHT POSTERIOR TIBIAL COMPRESS: NORMAL
BH CV LOWER VASCULAR RIGHT PROXIMAL FEMORAL COMPRESS: NORMAL
BH CV LOWER VASCULAR RIGHT SOLEAL COMPRESS: NORMAL
BH CV LOWER VASCULAR RIGHT VARICOSITY BK COMPRESS: NORMAL
MAXIMAL PREDICTED HEART RATE: 162 BPM
STRESS TARGET HR: 138 BPM

## 2022-02-10 PROCEDURE — 93971 EXTREMITY STUDY: CPT | Performed by: SURGERY

## 2022-02-10 PROCEDURE — 93971 EXTREMITY STUDY: CPT

## 2022-02-10 NOTE — TELEPHONE ENCOUNTER
Caller: Elias Baron    Relationship: Self    Best call back number: [phone]    Requested Prescriptions:   Requested Prescriptions     Pending Prescriptions Disp Refills   • Dulaglutide (Trulicity) 1.5 MG/0.5ML solution pen-injector 4 pen 1     Sig: Inject 1.5 mg under the skin into the appropriate area as directed 1 (One) Time Per Week.        [facility] [place] [facility] [address]  [facility] [phone] [facility] [phone] FX     Additional details provided by patient: PATIENT IS WANTING TO KNOW IF SHE CAN STAY ON THE DOSE SHE WAS ON RECENTLY FOR 1.5 OR IF THE DOSE NEEDS TO BE UPPED BEFORE HER NEW REFILL IS FILLED.     SHE TOOK HER LAST ONE Monday AND WILL NEED REFILL BY Monday, February 14TH     Does the patient have less than a 3 day supply:  [x] Yes  [] No    Betsey Garcia Rep   02/08/22 10:44 EST           
Caller: Elias Baron    Relationship: Self    Best call back number: [phone]    What test/procedure requested: TRULICITY    Additional information or concerns: PATIENT WAS TOLD THAT TRULICITY NEEDS A PRIOR AUTHORIZATION.  
PA started.  
Stay on the 1.5 mg weekly  
63F with new posterior HA, bradycardia, hypertension concern for spontaneous SAH. Labs, BP control SBP <160. Labs. Head CT and neurosurgery consult if positive

## 2022-02-14 ENCOUNTER — TELEPHONE (OUTPATIENT)
Dept: FAMILY MEDICINE CLINIC | Facility: CLINIC | Age: 57
End: 2022-02-14

## 2022-02-14 NOTE — TELEPHONE ENCOUNTER
Caller: Elias Baron    Relationship: Self    Best call back number: [phone]     What is the best time to reach you: ANY, AND AFTER 3PM IS BEST  Who are you requesting to speak with (clinical staff, provider,  specific staff member): CLINICAL     What was the call regarding: PATIENT STATED THAT SHE HAS 3 WEEKS OF TRULICITY 1.5MG REMAINING WITH NO MORE REFILLS, NEEDING TO KNOW IF APPOINTMENT IS REQUIRES TO HAVE REFILLED   [facility] - [place] - [address]  [facility] - [phone] [facility] [phone] FX    Do you require a callback: YES

## 2022-02-15 RX ORDER — DULAGLUTIDE 1.5 MG/.5ML
1.5 INJECTION, SOLUTION SUBCUTANEOUS WEEKLY
Qty: 4 PEN | Refills: 1 | Status: SHIPPED | OUTPATIENT
Start: 2022-02-15 | End: 2022-04-11 | Stop reason: DRUGHIGH

## 2022-03-08 ENCOUNTER — TELEPHONE (OUTPATIENT)
Dept: FAMILY MEDICINE CLINIC | Facility: CLINIC | Age: 57
End: 2022-03-08

## 2022-03-08 NOTE — TELEPHONE ENCOUNTER
Caller: Elias Baron    Relationship to patient: Self    Best call back number: [phone]    Chief complaint: RIGHT FLANK PAIN    Type of visit: OFFICE    Requested date: ASAP AFTER 1PM       Additional notes:THE PATIENT WOULD LIKE TO BE SEEN BY ANY PROVIDER AFTER 1 PM AND A CALL BACK TO SCHEDULE

## 2022-03-09 ENCOUNTER — OFFICE VISIT (OUTPATIENT)
Dept: FAMILY MEDICINE CLINIC | Facility: CLINIC | Age: 57
End: 2022-03-09

## 2022-03-09 VITALS
WEIGHT: 256 LBS | HEIGHT: 67 IN | HEART RATE: 107 BPM | BODY MASS INDEX: 40.18 KG/M2 | SYSTOLIC BLOOD PRESSURE: 149 MMHG | OXYGEN SATURATION: 96 % | DIASTOLIC BLOOD PRESSURE: 90 MMHG

## 2022-03-09 DIAGNOSIS — R31.9 HEMATURIA, UNSPECIFIED TYPE: ICD-10-CM

## 2022-03-09 DIAGNOSIS — R10.9 FLANK PAIN: ICD-10-CM

## 2022-03-09 DIAGNOSIS — R10.9 ACUTE FLANK PAIN: Primary | ICD-10-CM

## 2022-03-09 LAB
BILIRUB BLD-MCNC: NEGATIVE MG/DL
CLARITY, POC: CLEAR
COLOR UR: YELLOW
EXPIRATION DATE: ABNORMAL
GLUCOSE UR STRIP-MCNC: NEGATIVE MG/DL
KETONES UR QL: NEGATIVE
LEUKOCYTE EST, POC: ABNORMAL
Lab: ABNORMAL
NITRITE UR-MCNC: NEGATIVE MG/ML
PH UR: 5.5 [PH] (ref 5–8)
PROT UR STRIP-MCNC: NEGATIVE MG/DL
RBC # UR STRIP: ABNORMAL /UL
SP GR UR: 1.02 (ref 1–1.03)
UROBILINOGEN UR QL: NORMAL

## 2022-03-09 PROCEDURE — 81003 URINALYSIS AUTO W/O SCOPE: CPT | Performed by: NURSE PRACTITIONER

## 2022-03-09 PROCEDURE — 99213 OFFICE O/P EST LOW 20 MIN: CPT | Performed by: NURSE PRACTITIONER

## 2022-03-09 PROCEDURE — 87086 URINE CULTURE/COLONY COUNT: CPT | Performed by: NURSE PRACTITIONER

## 2022-03-09 NOTE — PROGRESS NOTES
Chief Complaint  Flank Pain (Patient is complaining of right flank pain. Patient states that this has been going on for about 5-6 days. Patient states that it is on her right side. Patient states that she has been congested and has been on predisone and a zpak, Claritin D. )    Subjective          Elias Baron presents to [facility]  History of Present Illness      Flank Pain (Patient is complaining of right flank pain. Patient states that this has been going on for 6-7 days.  Patient denies any urinary symptoms.  She has no history of kidney stones.  She is not having any issues with her bowels or bladder.  Patient does states that she was recently on antibiotics and decongestants and that she has not been drinking a lot of water.  Past Medical History:   Diagnosis Date   • Allergic    • Diabetes mellitus (HCC)    • Hypertension    • Hypothyroidism          Allergies   Allergen Reactions   • Amoxicillin-Pot Clavulanate Unknown - Low Severity   • Ciprofloxacin Unknown - Low Severity   • Meperidine Unknown - Low Severity   • Nitrofurantoin Unknown - Low Severity          Past Surgical History:   Procedure Laterality Date   • JOINT REPLACEMENT     • TUBAL ABDOMINAL LIGATION            Social History     Tobacco Use   • Smoking status: Never Smoker   • Smokeless tobacco: Never Used   Substance Use Topics   • Alcohol use: Never         History reviewed. No pertinent family history.       Current Outpatient Medications on File Prior to Visit   Medication Sig   • celecoxib (CeleBREX) 200 MG capsule Celebrex 200 mg oral capsule take 1 capsule (200 mg) by oral route once daily   Suspended   • Dulaglutide (Trulicity) 1.5 MG/0.5ML solution pen-injector Inject 1.5 mg under the skin into the appropriate area as directed 1 (One) Time Per Week.   • fluticasone (FLONASE) 50 MCG/ACT nasal spray 1 spray into the nostril(s) as directed by provider Daily.   • levothyroxine (SYNTHROID, LEVOTHROID) 75  "MCG tablet Synthroid 75 mcg oral tablet take 1 tablet (75 mcg) by oral route once daily on an empty stomach 30 minutes before breakfast   Active   • losartan (COZAAR) 25 MG tablet      No current facility-administered medications on file prior to visit.         Immunization History   Administered Date(s) Administered   • COVID-19 (MODERNA) 1st, 2nd, 3rd Dose Only 02/08/2021, 03/08/2021, 11/19/2021   • Flu Vaccine Intradermal Quad 18-64YR 11/02/2020   • Flu Vaccine Quad PF >18YRS 10/01/2019         /90   Pulse 107   Ht 170.2 cm (67\")   Wt 116 kg (256 lb)   SpO2 96%   BMI 40.10 kg/m²             Physical Exam  Vitals reviewed.   Constitutional:       Appearance: Normal appearance. She is well-developed.   HENT:      Head: Normocephalic and atraumatic.      Right Ear: External ear normal.      Left Ear: External ear normal.      Mouth/Throat:      Pharynx: No oropharyngeal exudate.   Eyes:      Conjunctiva/sclera: Conjunctivae normal.      Pupils: Pupils are equal, round, and reactive to light.   Cardiovascular:      Rate and Rhythm: Normal rate and regular rhythm.      Heart sounds: No murmur heard.    No friction rub. No gallop.   Pulmonary:      Effort: Pulmonary effort is normal.      Breath sounds: Normal breath sounds. No wheezing or rhonchi.   Musculoskeletal:      Lumbar back: Negative right straight leg raise test and negative left straight leg raise test.        Back:    Skin:     General: Skin is warm and dry.   Neurological:      Mental Status: She is alert and oriented to person, place, and time.      Cranial Nerves: No cranial nerve deficit.   Psychiatric:         Mood and Affect: Mood and affect normal.         Behavior: Behavior normal.         Thought Content: Thought content normal.         Judgment: Judgment normal.             Result Review :                POCT urinalysis dipstick, automated  Order: [id number]   Status: Final result     Visible to patient: No (scheduled for 3/9/2022 11:44 " PM)     Next appt: None     Dx: Acute flank pain    Specimen Information: Urine         0 Result Notes    Component   Ref Range & Units 09:43 1 yr ago   Color   Yellow, Straw, Dark Yellow, Lulu Yellow     Clarity, UA   Clear Clear  CLOUDY Abnormal  R    Specific Gravity    1.005 - 1.030 1.025  1.018 R    pH, Urine   5.0 - 8.0 5.5  5.0 R    Leukocytes   Negative Trace Abnormal   MODERATE Abnormal  R, CM    Nitrite, UA   Negative Negative  NEGATIVE R    Protein, POC   Negative mg/dL Negative     Glucose, UA   Negative, 1000 mg/dL (3+) mg/dL Negative  NEGATIVE R    Ketones, UA   Negative Negative  NEGATIVE R    Urobilinogen, UA   Normal Normal  0.2 R    Bilirubin   Negative Negative     Blood, UA   Negative Trace Abnormal   NEGATIVE R    Lot Number  [id number]     Expiration Date  10,312,022     Collection Source (UA)  Clean Catch R    Color  YELLOW R    Protein Urine Screen  NEGATIVE R    BILIRUBIN SCREEN URINE  NEGATIVE R    WBC, UA  MANY(21-50) Abnormal  R    RBC, UA  NONE SEEN R    Epithelial cells  11-20 R    BACTERIA  1+ Abnormal  R    Hyaline Casts, UA  2-5 R    Resulting Agency [facility]               Specimen Collected: 03/09/22 09:43 Last Resulted: 03/09/22 09:43         Lab Flowsheet      Order Details      View Encounter      Lab and Collection Details      Routing      Result History        CM=Additional comments  R=Reference range differs from displayed range          Result Care Coordination                     Assessment and Plan      Diagnoses and all orders for this visit:    1. Acute flank pain (Primary)  -     POCT urinalysis dipstick, automated  -     Urine Culture - Urine, Urine, Clean Catch; Future    2. Hematuria, unspecified type  -     Urine Culture - Urine, Urine, Clean Catch; Future    3. Flank pain  -     XR Abdomen KUB; Future      Advised patient to increase fluids.        Follow Up     Return if symptoms worsen or fail to improve, for Next scheduled follow  up.    Patient was given instructions and counseling regarding her condition or for health maintenance advice. Please see specific information pulled into the AVS if appropriate.

## 2022-03-10 ENCOUNTER — HOSPITAL ENCOUNTER (OUTPATIENT)
Dept: GENERAL RADIOLOGY | Facility: HOSPITAL | Age: 57
Discharge: HOME | End: 2022-03-10
Admitting: NURSE PRACTITIONER

## 2022-03-10 DIAGNOSIS — R10.9 FLANK PAIN: ICD-10-CM

## 2022-03-10 LAB — BACTERIA SPEC AEROBE CULT: NO GROWTH

## 2022-03-10 PROCEDURE — 74018 RADEX ABDOMEN 1 VIEW: CPT

## 2022-03-14 ENCOUNTER — TELEPHONE (OUTPATIENT)
Dept: FAMILY MEDICINE CLINIC | Facility: CLINIC | Age: 57
End: 2022-03-14

## 2022-04-04 ENCOUNTER — TELEPHONE (OUTPATIENT)
Dept: FAMILY MEDICINE CLINIC | Facility: CLINIC | Age: 57
End: 2022-04-04

## 2022-04-04 NOTE — TELEPHONE ENCOUNTER
Caller: Elias Baron    Relationship: Self    Best call back number: [phone]    Who are you requesting to speak with (clinical staff, provider,  specific staff member): CLINCAL  What was the call regarding: [facility] W[facility] FROM 7.2 TO 8.2. PATIENT STATES SHE WOULD LIKE TO KNOW WHAT TO DO GOING FORWARD. PLEASE CALL PATIENT AND ADVISE.    PATIENT STATES THYROID CAME BACK NORMAL.    Do you require a callback: YES    MEDICATION:   Dulaglutide (Trulicity) 1.5 MG/0.5ML solution pen-injector     PHARMACY:  [facility] [place], [place] - [address]  [facility] [phone] [facility] [phone] FX

## 2022-04-07 NOTE — TELEPHONE ENCOUNTER
Patient called about her message again. And wanting to know what to do about her Trulicity since she feels better however it did not help with her A1c.

## 2022-04-11 NOTE — TELEPHONE ENCOUNTER
Patient aware of the increase. Also told her via Vi that the patient is going to need to have diabetes, labs and meds managed by one doctor not one doctor [facility] and [facility] managing everything with meds. [facility] is going to manage by [facility] and Vi has sent in medication to get her through [facility].

## 2022-12-19 ENCOUNTER — TRANSCRIBE ORDERS (OUTPATIENT)
Dept: ADMINISTRATIVE | Facility: HOSPITAL | Age: 57
End: 2022-12-19

## 2022-12-19 DIAGNOSIS — I87.2 VENOUS INSUFFICIENCY: Primary | ICD-10-CM

## 2022-12-20 ENCOUNTER — HOSPITAL ENCOUNTER (OUTPATIENT)
Dept: CARDIOLOGY | Facility: HOSPITAL | Age: 57
Discharge: HOME | End: 2022-12-20
Admitting: SURGERY

## 2022-12-20 DIAGNOSIS — I87.2 VENOUS INSUFFICIENCY: ICD-10-CM

## 2022-12-20 LAB
BH CV LOW VAS LEFT GREATER SAPH AK VESSEL: 1
BH CV LOWER VASCULAR LEFT COMMON FEMORAL AUGMENT: NORMAL
BH CV LOWER VASCULAR LEFT COMMON FEMORAL COMPETENT: NORMAL
BH CV LOWER VASCULAR LEFT COMMON FEMORAL COMPRESS: NORMAL
BH CV LOWER VASCULAR LEFT COMMON FEMORAL PHASIC: NORMAL
BH CV LOWER VASCULAR LEFT COMMON FEMORAL SPONT: NORMAL
BH CV LOWER VASCULAR LEFT DISTAL FEMORAL COMPRESS: NORMAL
BH CV LOWER VASCULAR LEFT GASTRONEMIUS COMPRESS: NORMAL
BH CV LOWER VASCULAR LEFT GREATER SAPH AK COMPRESS: NORMAL
BH CV LOWER VASCULAR LEFT GREATER SAPH BK COMPRESS: NORMAL
BH CV LOWER VASCULAR LEFT LESSER SAPH COMPRESS: NORMAL
BH CV LOWER VASCULAR LEFT MID FEMORAL AUGMENT: NORMAL
BH CV LOWER VASCULAR LEFT MID FEMORAL COMPETENT: NORMAL
BH CV LOWER VASCULAR LEFT MID FEMORAL COMPRESS: NORMAL
BH CV LOWER VASCULAR LEFT MID FEMORAL PHASIC: NORMAL
BH CV LOWER VASCULAR LEFT MID FEMORAL SPONT: NORMAL
BH CV LOWER VASCULAR LEFT PERONEAL COMPRESS: NORMAL
BH CV LOWER VASCULAR LEFT POPLITEAL AUGMENT: NORMAL
BH CV LOWER VASCULAR LEFT POPLITEAL COMPETENT: NORMAL
BH CV LOWER VASCULAR LEFT POPLITEAL COMPRESS: NORMAL
BH CV LOWER VASCULAR LEFT POPLITEAL PHASIC: NORMAL
BH CV LOWER VASCULAR LEFT POPLITEAL SPONT: NORMAL
BH CV LOWER VASCULAR LEFT POSTERIOR TIBIAL COMPRESS: NORMAL
BH CV LOWER VASCULAR LEFT PROXIMAL FEMORAL COMPRESS: NORMAL
BH CV LOWER VASCULAR RIGHT COMMON FEMORAL AUGMENT: NORMAL
BH CV LOWER VASCULAR RIGHT COMMON FEMORAL COMPETENT: NORMAL
BH CV LOWER VASCULAR RIGHT COMMON FEMORAL COMPRESS: NORMAL
BH CV LOWER VASCULAR RIGHT COMMON FEMORAL PHASIC: NORMAL
BH CV LOWER VASCULAR RIGHT COMMON FEMORAL SPONT: NORMAL
MAXIMAL PREDICTED HEART RATE: 161 BPM
STRESS TARGET HR: 137 BPM

## 2022-12-20 PROCEDURE — 93971 EXTREMITY STUDY: CPT | Performed by: SURGERY

## 2022-12-20 PROCEDURE — 93971 EXTREMITY STUDY: CPT

## 2023-07-25 PROCEDURE — 87081 CULTURE SCREEN ONLY: CPT

## 2024-03-01 ENCOUNTER — TELEPHONE (OUTPATIENT)
Dept: FAMILY MEDICINE CLINIC | Facility: CLINIC | Age: 59
End: 2024-03-01

## 2024-03-01 NOTE — TELEPHONE ENCOUNTER
Caller: Elias Baron    Relationship to patient: Self    Best call back number: [phone]     Chief complaint: CONSTANT CLEARING OF THROAT    Type of visit: OFFICE    Requested date: ASAP    If rescheduling, when is the original appointment: 4/30/24

## 2024-03-05 ENCOUNTER — OFFICE VISIT (OUTPATIENT)
Dept: FAMILY MEDICINE CLINIC | Facility: CLINIC | Age: 59
End: 2024-03-05
Payer: COMMERCIAL

## 2024-03-05 VITALS
OXYGEN SATURATION: 99 % | BODY MASS INDEX: 37.73 KG/M2 | HEIGHT: 67 IN | WEIGHT: 240.4 LBS | HEART RATE: 84 BPM | DIASTOLIC BLOOD PRESSURE: 64 MMHG | SYSTOLIC BLOOD PRESSURE: 123 MMHG | TEMPERATURE: 97.6 F

## 2024-03-05 DIAGNOSIS — K21.9 GASTROESOPHAGEAL REFLUX DISEASE WITHOUT ESOPHAGITIS: ICD-10-CM

## 2024-03-05 DIAGNOSIS — I10 HYPERTENSION, UNSPECIFIED TYPE: ICD-10-CM

## 2024-03-05 DIAGNOSIS — Z00.00 ANNUAL PHYSICAL EXAM: Primary | ICD-10-CM

## 2024-03-05 DIAGNOSIS — E03.8 OTHER SPECIFIED HYPOTHYROIDISM: ICD-10-CM

## 2024-03-05 RX ORDER — PANTOPRAZOLE SODIUM 40 MG/1
40 TABLET, DELAYED RELEASE ORAL DAILY
COMMUNITY
Start: 2024-01-23

## 2024-03-05 RX ORDER — SEMAGLUTIDE 1.34 MG/ML
1 INJECTION, SOLUTION SUBCUTANEOUS WEEKLY
COMMUNITY

## 2024-03-05 NOTE — PROGRESS NOTES
Chief Complaint    Annual physical exam  Throat Irritation    SUBJECTIVE  Elias Baron presents to [facility]    Annual physical exam    Patient complaining of constantly having to clear her throat.  Patient notes she has had CXR and PFT in last few months for another reason, all within normal limits.  Patient has not noticed any triggers.  Patient notes she does have GERD but has not noticed if she has improvement in symptoms when she takes her Pantoprazole.  Patient admits to occasional difficulty swallowing.  Patient feels like she has a very small opening in her esophagus which causes her to choke on dry meats.  Patient has not had an EGD prior, though it has been [facility].  [facility] with [facility] and   History of Present Illness  Past Medical History:   Diagnosis Date    Allergic     Diabetes mellitus     Hypertension     Hypothyroidism       Family History   Problem Relation Age of Onset    Diabetes Father     Diabetes Maternal Grandfather       Past Surgical History:   Procedure Laterality Date    JOINT REPLACEMENT      TUBAL ABDOMINAL LIGATION          Current Outpatient Medications:     fluticasone (FLONASE) 50 MCG/ACT nasal spray, 1 spray into the nostril(s) as directed by provider Daily., Disp: , Rfl:     levothyroxine (SYNTHROID, LEVOTHROID) 75 MCG tablet, Synthroid 75 mcg oral tablet take 1 tablet (75 mcg) by oral route once daily on an empty stomach 30 minutes before breakfast   Active, Disp: , Rfl:     losartan (COZAAR) 25 MG tablet, , Disp: , Rfl:     pantoprazole (PROTONIX) 40 MG EC tablet, Take 1 tablet by mouth Daily., Disp: , Rfl:     Semaglutide, 1 MG/DOSE, (Ozempic, 1 MG/DOSE,) 2 MG/1.5ML solution pen-injector, Inject 1 mg under the skin into the appropriate area as directed 1 (One) Time Per Week., Disp: , Rfl:     OBJECTIVE  Vital Signs:   /64 (BP Location: Left arm, Patient Position: Sitting, Cuff Size: Large Adult)   " Pulse 84   Temp 97.6 °F (36.4 °C) (Oral)   Ht 170.2 cm (67\")   Wt 109 kg (240 lb 6.4 oz)   SpO2 99%   BMI 37.65 kg/m²    Estimated body mass index is 37.65 kg/m² as calculated from the following:    Height as of this encounter: 170.2 cm (67\").    Weight as of this encounter: 109 kg (240 lb 6.4 oz).     Wt Readings from Last 3 Encounters:   03/05/24 109 kg (240 lb 6.4 oz)   11/08/23 108 kg (239 lb)   07/25/23 109 kg (240 lb)     BP Readings from Last 3 Encounters:   03/05/24 123/64   11/08/23 129/86   07/25/23 139/92       Physical Exam  Vitals reviewed.   Constitutional:       Appearance: Normal appearance. She is well-developed.   HENT:      Head: Normocephalic and atraumatic.      Right Ear: External ear normal.      Left Ear: External ear normal.      Mouth/Throat:      Pharynx: No oropharyngeal exudate.   Eyes:      Conjunctiva/sclera: Conjunctivae normal.      Pupils: Pupils are equal, round, and reactive to light.   Neck:      Vascular: No carotid bruit.   Cardiovascular:      Rate and Rhythm: Normal rate and regular rhythm.      Pulses: Normal pulses.      Heart sounds: Normal heart sounds. No murmur heard.     No friction rub. No gallop.   Pulmonary:      Effort: Pulmonary effort is normal.      Breath sounds: Normal breath sounds. No wheezing or rhonchi.   Skin:     General: Skin is warm and dry.   Neurological:      Mental Status: She is alert and oriented to person, place, and time.      Cranial Nerves: No cranial nerve deficit.   Psychiatric:         Mood and Affect: Mood and affect normal.         Behavior: Behavior normal.         Thought Content: Thought content normal.         Judgment: Judgment normal.          Result Review          The above data has been reviewed by FRIDA Alcazar 03/05/2024 07:50 EST.          Patient Care Team:  Vi Tinoco APRN as PCP - General (Family Medicine)  Arielle Ascencio PA (Physician Assistant)  Ronni Brandt MD as Consulting " Physician (General Surgery)            ASSESSMENT & PLAN    Diagnoses and all orders for this visit:    1. Annual physical exam (Primary)    2. Hypertension, unspecified type  Comments:  BP well-controlled, continue current medications    3. Other specified hypothyroidism  Stable and controlled on current dose of levothyroxine  4. Gastroesophageal reflux disease without esophagitis  Comments:  Advised to take Protonix daily to help with dyspepsia and throat clearing.  Patient to follow-up in 1 month if not improving     The patient is advised to continue current medications, continue current healthy lifestyle patterns, and return for routine annual checkups.    Patient advised to bring me copy of upcoming lab work results once she obtains those.  She will also bring a copy of mammogram when she has completed this August.  Tobacco Use: Low Risk  (3/5/2024)    Patient History     Smoking Tobacco Use: Never     Smokeless Tobacco Use: Never     Passive Exposure: Not on file       Follow Up     Return if symptoms worsen or fail to improve.      Patient was given instructions and counseling regarding her condition or for health maintenance advice. Please see specific information pulled into the AVS if appropriate.   I have reviewed information obtained and documented by others and I have confirmed the accuracy of this documented note.    Vi Tinoco, FRIDA        Answers submitted by the patient for this visit:  Primary Reason for Visit (Submitted on 3/5/2024)  What is the primary reason for your visit?: Cough

## 2024-04-16 ENCOUNTER — OFFICE VISIT (OUTPATIENT)
Dept: FAMILY MEDICINE CLINIC | Facility: CLINIC | Age: 59
End: 2024-04-16
Payer: COMMERCIAL

## 2024-04-16 VITALS
DIASTOLIC BLOOD PRESSURE: 67 MMHG | OXYGEN SATURATION: 97 % | HEIGHT: 67 IN | WEIGHT: 243.2 LBS | HEART RATE: 97 BPM | BODY MASS INDEX: 38.17 KG/M2 | SYSTOLIC BLOOD PRESSURE: 121 MMHG | TEMPERATURE: 97.6 F

## 2024-04-16 DIAGNOSIS — I10 HYPERTENSION, UNSPECIFIED TYPE: ICD-10-CM

## 2024-04-16 DIAGNOSIS — E03.9 HYPOTHYROIDISM, UNSPECIFIED TYPE: ICD-10-CM

## 2024-04-16 DIAGNOSIS — D69.6 LOW PLATELET COUNT: Primary | ICD-10-CM

## 2024-04-16 DIAGNOSIS — T50.905A DRUG-INDUCED DYSPEPSIA: ICD-10-CM

## 2024-04-16 DIAGNOSIS — R10.13 DRUG-INDUCED DYSPEPSIA: ICD-10-CM

## 2024-04-16 PROCEDURE — 99214 OFFICE O/P EST MOD 30 MIN: CPT | Performed by: NURSE PRACTITIONER

## 2024-04-16 NOTE — PROGRESS NOTES
Chief Complaint  Follow-up (1 month) and Heartburn    SUBJECTIVE  Elias Baron presents to [facility]    Gastroesophageal Reflux:  Patient is taking Pantoprazole - new medication at last office visit, with good control of symptoms.  Patient states her cough and constant throat clearing have resolved with medication.  Patient does not need over the counter medications for breakthrough symptoms.  Patient tries to avoid trigger foods, eat frequent small meals, not lie down within 2 hours of eating, avoids NSAIDS medications and alcohol.    Patient would like to discuss [facility].  Patient states she has had increasingly low platelet count.  It started out normal and has gradually decreasing from [phone] and now 106.  Patient is concerned and states the [facility] is not [facility] the issue.  It is noted that patient was started on Ozempic at the time this started.  Although thrombocytopenia is not listed as a common or serious adverse reaction from taking the Ozempic it is noted that in 1 trial study patients did experience low platelet count.  Patient does say she has had some episodes of easy bruising but she feels as though that has improved.    History of Present Illness  Past Medical History:   Diagnosis Date    Allergic     Diabetes mellitus     Hypertension     Hypothyroidism       Family History   Problem Relation Age of Onset    Diabetes Father     Diabetes Maternal Grandfather       Past Surgical History:   Procedure Laterality Date    JOINT REPLACEMENT      TUBAL ABDOMINAL LIGATION          Current Outpatient Medications:     fluticasone (FLONASE) 50 MCG/ACT nasal spray, 1 spray into the nostril(s) as directed by provider Daily., Disp: , Rfl:     levothyroxine (SYNTHROID, LEVOTHROID) 75 MCG tablet, Synthroid 75 mcg oral tablet take 1 tablet (75 mcg) by oral route once daily on an empty stomach 30 minutes before breakfast   Active, Disp: , Rfl:      "losartan (COZAAR) 25 MG tablet, , Disp: , Rfl:     pantoprazole (PROTONIX) 40 MG EC tablet, Take 1 tablet by mouth Daily., Disp: , Rfl:     Semaglutide, 1 MG/DOSE, (Ozempic, 1 MG/DOSE,) 2 MG/1.5ML solution pen-injector, Inject 1 mg under the skin into the appropriate area as directed 1 (One) Time Per Week., Disp: , Rfl:     OBJECTIVE  Vital Signs:   /67 (BP Location: Left arm, Patient Position: Sitting, Cuff Size: Large Adult)   Pulse 97   Temp 97.6 °F (36.4 °C) (Oral)   Ht 170.2 cm (67\")   Wt 110 kg (243 lb 3.2 oz)   SpO2 97%   BMI 38.09 kg/m²    Estimated body mass index is 38.09 kg/m² as calculated from the following:    Height as of this encounter: 170.2 cm (67\").    Weight as of this encounter: 110 kg (243 lb 3.2 oz).     Wt Readings from Last 3 Encounters:   04/16/24 110 kg (243 lb 3.2 oz)   03/05/24 109 kg (240 lb 6.4 oz)   11/08/23 108 kg (239 lb)     BP Readings from Last 3 Encounters:   04/16/24 121/67   03/05/24 123/64   11/08/23 129/86       Physical Exam  Vitals reviewed.   Constitutional:       Appearance: Normal appearance. She is well-developed.   HENT:      Head: Normocephalic and atraumatic.      Right Ear: External ear normal.      Left Ear: External ear normal.      Mouth/Throat:      Pharynx: No oropharyngeal exudate.   Eyes:      Conjunctiva/sclera: Conjunctivae normal.      Pupils: Pupils are equal, round, and reactive to light.   Neck:      Vascular: No carotid bruit.   Cardiovascular:      Rate and Rhythm: Normal rate and regular rhythm.      Pulses: Normal pulses.      Heart sounds: Normal heart sounds. No murmur heard.     No friction rub. No gallop.   Pulmonary:      Effort: Pulmonary effort is normal.      Breath sounds: Normal breath sounds. No wheezing or rhonchi.   Skin:     General: Skin is warm and dry.   Neurological:      Mental Status: She is alert and oriented to person, place, and time.      Cranial Nerves: No cranial nerve deficit.   Psychiatric:         Mood and " Affect: Mood and affect normal.         Behavior: Behavior normal.         Thought Content: Thought content normal.         Judgment: Judgment normal.          Result Review        No Images in the past 120 days found..      The above data has been reviewed by FRIDA Alcazar 04/16/2024 09:02 EDT.          Patient Care Team:  Vi Tinoco APRN as PCP - General (Family Medicine)  Arielle Ascencio PA (Physician Assistant)  Ronni Brandt MD as Consulting Physician (General Surgery)            ASSESSMENT & PLAN    Diagnoses and all orders for this visit:    1. Low platelet count (Primary)  Comments:  Recheck labs  Orders:  -     Iron Profile; Future  -     Vitamin B12 & Folate; Future  -     Ferritin; Future  -     CBC Auto Differential; Future    2. Hypothyroidism, unspecified type  Comments:  Recheck lab  Orders:  -     TSH+Free T4; Future    3. Drug-induced dyspepsia  Comments:  Symptoms improved and controlled, continue Protonix    4. Hypertension, unspecified type  Comments:  BP well-controlled, continue medication         Tobacco Use: Low Risk  (4/16/2024)    Patient History     Smoking Tobacco Use: Never     Smokeless Tobacco Use: Never     Passive Exposure: Not on file       Follow Up     Return if symptoms worsen or fail to improve.      Patient was given instructions and counseling regarding her condition or for health maintenance advice. Please see specific information pulled into the AVS if appropriate.   I have reviewed information obtained and documented by others and I have confirmed the accuracy of this documented note.    FRIDA Alcazar        Answers submitted by the patient for this visit:  Other (Submitted on 4/9/2024)  Please describe your symptoms.: Follow up for excessive coughing possibly due to acid reflux  Have you had these symptoms before?: Yes  How long have you been having these symptoms?: Greater than 2 weeks  Please list any medications you are currently  taking for this condition.: Panteoprazole  Primary Reason for Visit (Submitted on 4/9/2024)  What is the primary reason for your visit?: Other

## 2024-04-17 DIAGNOSIS — D69.6 LOW PLATELET COUNT: ICD-10-CM

## 2024-04-17 DIAGNOSIS — E03.9 HYPOTHYROIDISM, UNSPECIFIED TYPE: ICD-10-CM

## 2024-04-24 DIAGNOSIS — D69.6 THROMBOCYTOPENIA: Primary | ICD-10-CM

## 2024-06-05 ENCOUNTER — CONSULT (OUTPATIENT)
Dept: ONCOLOGY | Facility: HOSPITAL | Age: 59
End: 2024-06-05
Payer: COMMERCIAL

## 2024-06-05 ENCOUNTER — LAB (OUTPATIENT)
Dept: ONCOLOGY | Facility: HOSPITAL | Age: 59
End: 2024-06-05
Payer: COMMERCIAL

## 2024-06-05 VITALS
DIASTOLIC BLOOD PRESSURE: 71 MMHG | OXYGEN SATURATION: 96 % | BODY MASS INDEX: 37.9 KG/M2 | SYSTOLIC BLOOD PRESSURE: 137 MMHG | WEIGHT: 242 LBS | HEART RATE: 88 BPM

## 2024-06-05 DIAGNOSIS — R79.89 ELEVATED LIVER FUNCTION TESTS: ICD-10-CM

## 2024-06-05 DIAGNOSIS — D69.6 THROMBOCYTOPENIA: ICD-10-CM

## 2024-06-05 DIAGNOSIS — D69.6 THROMBOCYTOPENIA: Primary | ICD-10-CM

## 2024-06-05 LAB
ALBUMIN SERPL-MCNC: 3.9 G/DL (ref 3.5–5.2)
ALBUMIN/GLOB SERPL: 1.6 G/DL
ALP SERPL-CCNC: 101 U/L (ref 39–117)
ALT SERPL W P-5'-P-CCNC: 29 U/L (ref 1–33)
ANION GAP SERPL CALCULATED.3IONS-SCNC: 5.8 MMOL/L (ref 5–15)
AST SERPL-CCNC: 36 U/L (ref 1–32)
BASOPHILS # BLD AUTO: 0.04 10*3/MM3 (ref 0–0.2)
BASOPHILS NFR BLD AUTO: 0.7 % (ref 0–1.5)
BILIRUB SERPL-MCNC: 0.7 MG/DL (ref 0–1.2)
BUN SERPL-MCNC: 14 MG/DL (ref 8–23)
BUN/CREAT SERPL: 15.9 (ref 7–25)
CALCIUM SPEC-SCNC: 9.3 MG/DL (ref 8.6–10.5)
CHLORIDE SERPL-SCNC: 106 MMOL/L (ref 98–107)
CO2 SERPL-SCNC: 26.2 MMOL/L (ref 22–29)
CREAT SERPL-MCNC: 0.88 MG/DL (ref 0.57–1)
CYTO UR: NORMAL
DEPRECATED RDW RBC AUTO: 45.7 FL (ref 37–54)
EGFRCR SERPLBLD CKD-EPI 2021: 74.9 ML/MIN/1.73
EOSINOPHIL # BLD AUTO: 0.17 10*3/MM3 (ref 0–0.4)
EOSINOPHIL # BLD MANUAL: 0.11 10*3/MM3 (ref 0–0.4)
EOSINOPHIL NFR BLD AUTO: 3 % (ref 0.3–6.2)
EOSINOPHIL NFR BLD MANUAL: 2 % (ref 0.3–6.2)
ERYTHROCYTE [DISTWIDTH] IN BLOOD BY AUTOMATED COUNT: 12.9 % (ref 12.3–15.4)
GLOBULIN UR ELPH-MCNC: 2.4 GM/DL
GLUCOSE SERPL-MCNC: 207 MG/DL (ref 65–99)
HCT VFR BLD AUTO: 41.7 % (ref 34–46.6)
HGB BLD-MCNC: 14 G/DL (ref 12–15.9)
IMM GRANULOCYTES # BLD AUTO: 0.01 10*3/MM3 (ref 0–0.05)
IMM GRANULOCYTES NFR BLD AUTO: 0.2 % (ref 0–0.5)
LAB AP CASE REPORT: NORMAL
LAB AP CLINICAL INFORMATION: NORMAL
LYMPHOCYTES # BLD AUTO: 1.73 10*3/MM3 (ref 0.7–3.1)
LYMPHOCYTES # BLD MANUAL: 2.07 10*3/MM3 (ref 0.7–3.1)
LYMPHOCYTES NFR BLD AUTO: 30.9 % (ref 19.6–45.3)
LYMPHOCYTES NFR BLD MANUAL: 6 % (ref 5–12)
MCH RBC QN AUTO: 32 PG (ref 26.6–33)
MCHC RBC AUTO-ENTMCNC: 33.6 G/DL (ref 31.5–35.7)
MCV RBC AUTO: 95.2 FL (ref 79–97)
MONOCYTES # BLD AUTO: 0.49 10*3/MM3 (ref 0.1–0.9)
MONOCYTES # BLD: 0.34 10*3/MM3 (ref 0.1–0.9)
MONOCYTES NFR BLD AUTO: 8.8 % (ref 5–12)
NEUTROPHILS # BLD AUTO: 3.08 10*3/MM3 (ref 1.7–7)
NEUTROPHILS NFR BLD AUTO: 3.16 10*3/MM3 (ref 1.7–7)
NEUTROPHILS NFR BLD AUTO: 56.4 % (ref 42.7–76)
NEUTROPHILS NFR BLD MANUAL: 55 % (ref 42.7–76)
PATH REPORT.FINAL DX SPEC: NORMAL
PATH REPORT.GROSS SPEC: NORMAL
PATHOLOGY REVIEW: YES
PLATELET # BLD AUTO: 110 10*3/MM3 (ref 140–450)
PMV BLD AUTO: 9.9 FL (ref 6–12)
POTASSIUM SERPL-SCNC: 4.5 MMOL/L (ref 3.5–5.2)
PROT SERPL-MCNC: 6.3 G/DL (ref 6–8.5)
RBC # BLD AUTO: 4.38 10*6/MM3 (ref 3.77–5.28)
RBC MORPH BLD: NORMAL
SMALL PLATELETS BLD QL SMEAR: NORMAL
SODIUM SERPL-SCNC: 138 MMOL/L (ref 136–145)
VARIANT LYMPHS NFR BLD MANUAL: 37 % (ref 19.6–45.3)
WBC MORPH BLD: NORMAL
WBC NRBC COR # BLD AUTO: 5.6 10*3/MM3 (ref 3.4–10.8)

## 2024-06-05 PROCEDURE — 85025 COMPLETE CBC W/AUTO DIFF WBC: CPT

## 2024-06-05 PROCEDURE — 36415 COLL VENOUS BLD VENIPUNCTURE: CPT

## 2024-06-05 PROCEDURE — 80053 COMPREHEN METABOLIC PANEL: CPT

## 2024-06-05 PROCEDURE — G0463 HOSPITAL OUTPT CLINIC VISIT: HCPCS | Performed by: NURSE PRACTITIONER

## 2024-09-20 ENCOUNTER — LAB (OUTPATIENT)
Dept: LAB | Facility: HOSPITAL | Age: 59
End: 2024-09-20
Payer: COMMERCIAL

## 2024-09-20 DIAGNOSIS — D69.6 THROMBOCYTOPENIA: ICD-10-CM

## 2024-09-20 DIAGNOSIS — R79.89 ELEVATED LIVER FUNCTION TESTS: ICD-10-CM

## 2024-09-20 LAB
ALBUMIN SERPL-MCNC: 3.7 G/DL (ref 3.5–5.2)
ALBUMIN/GLOB SERPL: 1.2 G/DL
ALP SERPL-CCNC: 115 U/L (ref 39–117)
ALT SERPL W P-5'-P-CCNC: 31 U/L (ref 1–33)
ANION GAP SERPL CALCULATED.3IONS-SCNC: 11.4 MMOL/L (ref 5–15)
AST SERPL-CCNC: 41 U/L (ref 1–32)
BASOPHILS # BLD AUTO: 0.04 10*3/MM3 (ref 0–0.2)
BASOPHILS NFR BLD AUTO: 0.8 % (ref 0–1.5)
BILIRUB SERPL-MCNC: 0.8 MG/DL (ref 0–1.2)
BUN SERPL-MCNC: 12 MG/DL (ref 8–23)
BUN/CREAT SERPL: 12.6 (ref 7–25)
CALCIUM SPEC-SCNC: 9.5 MG/DL (ref 8.6–10.5)
CHLORIDE SERPL-SCNC: 105 MMOL/L (ref 98–107)
CO2 SERPL-SCNC: 24.6 MMOL/L (ref 22–29)
CREAT SERPL-MCNC: 0.95 MG/DL (ref 0.57–1)
DEPRECATED RDW RBC AUTO: 43.2 FL (ref 37–54)
EGFRCR SERPLBLD CKD-EPI 2021: 68.3 ML/MIN/1.73
EOSINOPHIL # BLD AUTO: 0.18 10*3/MM3 (ref 0–0.4)
EOSINOPHIL NFR BLD AUTO: 3.7 % (ref 0.3–6.2)
ERYTHROCYTE [DISTWIDTH] IN BLOOD BY AUTOMATED COUNT: 12 % (ref 12.3–15.4)
GLOBULIN UR ELPH-MCNC: 3.1 GM/DL
GLUCOSE SERPL-MCNC: 213 MG/DL (ref 65–99)
HCT VFR BLD AUTO: 42.6 % (ref 34–46.6)
HGB BLD-MCNC: 13.9 G/DL (ref 12–15.9)
IMM GRANULOCYTES # BLD AUTO: 0.01 10*3/MM3 (ref 0–0.05)
IMM GRANULOCYTES NFR BLD AUTO: 0.2 % (ref 0–0.5)
LYMPHOCYTES # BLD AUTO: 1.44 10*3/MM3 (ref 0.7–3.1)
LYMPHOCYTES NFR BLD AUTO: 29.6 % (ref 19.6–45.3)
MCH RBC QN AUTO: 31.7 PG (ref 26.6–33)
MCHC RBC AUTO-ENTMCNC: 32.6 G/DL (ref 31.5–35.7)
MCV RBC AUTO: 97 FL (ref 79–97)
MONOCYTES # BLD AUTO: 0.46 10*3/MM3 (ref 0.1–0.9)
MONOCYTES NFR BLD AUTO: 9.4 % (ref 5–12)
NEUTROPHILS NFR BLD AUTO: 2.74 10*3/MM3 (ref 1.7–7)
NEUTROPHILS NFR BLD AUTO: 56.3 % (ref 42.7–76)
NRBC BLD AUTO-RTO: 0 /100 WBC (ref 0–0.2)
PLATELET # BLD AUTO: 115 10*3/MM3 (ref 140–450)
PMV BLD AUTO: 9.7 FL (ref 6–12)
POTASSIUM SERPL-SCNC: 4.5 MMOL/L (ref 3.5–5.2)
PROT SERPL-MCNC: 6.8 G/DL (ref 6–8.5)
RBC # BLD AUTO: 4.39 10*6/MM3 (ref 3.77–5.28)
SODIUM SERPL-SCNC: 141 MMOL/L (ref 136–145)
WBC NRBC COR # BLD AUTO: 4.87 10*3/MM3 (ref 3.4–10.8)

## 2024-09-20 PROCEDURE — 80053 COMPREHEN METABOLIC PANEL: CPT

## 2024-09-20 PROCEDURE — 36415 COLL VENOUS BLD VENIPUNCTURE: CPT

## 2024-09-20 PROCEDURE — 85025 COMPLETE CBC W/AUTO DIFF WBC: CPT

## 2024-10-07 ENCOUNTER — OFFICE VISIT (OUTPATIENT)
Dept: ONCOLOGY | Facility: HOSPITAL | Age: 59
End: 2024-10-07
Payer: COMMERCIAL

## 2024-10-07 VITALS
TEMPERATURE: 98 F | HEIGHT: 67 IN | WEIGHT: 243.8 LBS | HEART RATE: 91 BPM | SYSTOLIC BLOOD PRESSURE: 136 MMHG | DIASTOLIC BLOOD PRESSURE: 76 MMHG | BODY MASS INDEX: 38.27 KG/M2 | RESPIRATION RATE: 18 BRPM | OXYGEN SATURATION: 98 %

## 2024-10-07 DIAGNOSIS — D69.6 THROMBOCYTOPENIA: Primary | ICD-10-CM

## 2024-10-07 PROCEDURE — G0463 HOSPITAL OUTPT CLINIC VISIT: HCPCS | Performed by: INTERNAL MEDICINE

## 2024-10-07 PROCEDURE — 99213 OFFICE O/P EST LOW 20 MIN: CPT | Performed by: INTERNAL MEDICINE

## 2024-10-07 NOTE — PROGRESS NOTES
Chief Complaint/Reason for Referral:  Thrombocytopenia        Vi Tinoco APRN      Subjective    History of Present Illness    Ms. Elias Baron is a very pleasant 59 year old  female who is here to follow-up on lab work.  She was noted to have mildly decreased platelet count.  She denies excessive bruising or bleeding.  She denies recurrent infections, fever, chills, weight loss, night sweats or adenopathy.        Current Outpatient Medications on File Prior to Visit   Medication Sig Dispense Refill    fluticasone (FLONASE) 50 MCG/ACT nasal spray Administer 1 spray into the nostril(s) as directed by provider Daily.      levothyroxine (SYNTHROID, LEVOTHROID) 75 MCG tablet Synthroid 75 mcg oral tablet take 1 tablet (75 mcg) by oral route once daily on an empty stomach 30 minutes before breakfast   Active      losartan (COZAAR) 25 MG tablet       pantoprazole (PROTONIX) 40 MG EC tablet Take 1 tablet by mouth Daily.      Semaglutide, 1 MG/DOSE, (Ozempic, 1 MG/DOSE,) 2 MG/1.5ML solution pen-injector Inject 1 mg under the skin into the appropriate area as directed 1 (One) Time Per Week.       No current facility-administered medications on file prior to visit.       Allergies   Allergen Reactions    Amoxicillin-Pot Clavulanate Diarrhea    Ciprofloxacin Rash    Empagliflozin Other (See Comments)     Other reaction(s): Genital infection    Glipizide Myalgia    Meperidine Diarrhea and Nausea And Vomiting    Metformin GI Intolerance     Other reaction(s): Gastroesophageal reflux disease    Nitrofurantoin Unknown - High Severity    Statins Nausea And Vomiting     Past Medical History:   Diagnosis Date    Allergic     Diabetes mellitus     Hypertension     Hypothyroidism      Past Surgical History:   Procedure Laterality Date    JOINT REPLACEMENT      TUBAL ABDOMINAL LIGATION       Social History     Socioeconomic History    Marital status:    Tobacco Use    Smoking status: Never    Smokeless tobacco:  Never   Vaping Use    Vaping status: Never Used   Substance and Sexual Activity    Alcohol use: Never    Drug use: Never    Sexual activity: Defer     Family History   Problem Relation Age of Onset    Diabetes Father     Diabetes Maternal Grandfather      Immunization History   Administered Date(s) Administered    Anthrax 09/19/2011, 10/21/2011    COVID-19 (MODERNA) 1st,2nd,3rd Dose Monovalent 02/08/2021, 03/08/2021, 11/19/2021    Flu Vaccine Intradermal Quad 18-64YR 11/02/2020    Flublok 18+yrs 10/20/2021    Fluzone  >6mos 10/21/2011    Fluzone (or Fluarix & Flulaval for VFC) >6mos 10/01/2019    Fluzone Quad >6mos (Multi-dose) 10/01/2019    H1N1 Inj 03/06/2010    Hep A / Hep B 02/15/2023    Hepatitis A 11/06/2005, 03/09/2007    Hepatitis B Adult/Adolescent IM 11/06/2005, 03/09/2007, 09/04/2007    Influenza Injectable Mdck Pf Quad 11/02/2020, 10/10/2022    Influenza MDCK Quadrivalent with Preserative 10/17/2019    Influenza Seasonal Injectable 09/13/2012, 10/06/2014, 10/05/2015, 10/01/2016, 10/01/2017, 10/01/2018    Influenza, Unspecified 11/01/2012, 10/01/2020, 10/21/2022, 10/01/2023    Pneumococcal Conjugate 20-Valent (PCV20) 07/20/2022    Pneumococcal Polysaccharide (PPSV23) 10/04/2017    Shingrix 07/01/2021, 12/28/2021, 07/12/2022    Smallpox 09/19/2011    Td (TDVAX) 11/03/1988, 11/06/2005    Tdap 09/19/2011, 11/01/2012, 11/08/2022    Typhoid Inactivated 09/19/2011    Typhoid, Unspecified 08/04/1990    Yellow Fever 08/04/1990    influenza Split 11/06/2005, 10/13/2008, 10/15/2009, 10/25/2010       Tobacco Use: Low Risk  (10/7/2024)    Patient History     Smoking Tobacco Use: Never     Smokeless Tobacco Use: Never     Passive Exposure: Not on file       Objective     Physical Exam  Vitals reviewed. Exam conducted with a chaperone present.   Cardiovascular:      Rate and Rhythm: Normal rate and regular rhythm.      Heart sounds: Normal heart sounds.   Pulmonary:      Effort: Pulmonary effort is normal.      Breath  "sounds: Normal breath sounds.   Abdominal:      General: Bowel sounds are normal.   Psychiatric:         Mood and Affect: Mood normal.         Behavior: Behavior normal.         Vitals:    10/07/24 0831   BP: 136/76   Pulse: 91   Resp: 18   Temp: 98 °F (36.7 °C)   TempSrc: Temporal   SpO2: 98%   Weight: 111 kg (243 lb 12.8 oz)   Height: 170.2 cm (67\")   PainSc: 0-No pain         Wt Readings from Last 3 Encounters:   10/07/24 111 kg (243 lb 12.8 oz)   06/05/24 110 kg (242 lb)   04/16/24 110 kg (243 lb 3.2 oz)                 ECOG score: 0         ECOG: (0) Fully Active - Able to Carry On All Pre-disease Performance Without Restriction  Fall Risk Assessment was completed, and patient is at low risk for falls.  PHQ-9 Total Score:         The patient is  experiencing fatigue. Fatigue score: 5    PT/OT Functional Screening: PT fx screen : No needs identified  Speech Functional Screening: Speech fx screen : No needs identified  Rehab to be ordered: Rehab to be ordered : No needs identified        Result Review :  The following data was reviewed by: Casper Chatterjee MD on 06/05/2024:  Lab Results   Component Value Date    HGB 13.9 09/20/2024    HCT 42.6 09/20/2024    MCV 97.0 09/20/2024     (L) 09/20/2024    WBC 4.87 09/20/2024    NEUTROABS 2.74 09/20/2024    LYMPHSABS 1.44 09/20/2024    MONOSABS 0.46 09/20/2024    EOSABS 0.18 09/20/2024    BASOSABS 0.04 09/20/2024     Lab Results   Component Value Date    GLUCOSE 213 (H) 09/20/2024    BUN 12 09/20/2024    CREATININE 0.95 09/20/2024     09/20/2024    K 4.5 09/20/2024     09/20/2024    CO2 24.6 09/20/2024    CALCIUM 9.5 09/20/2024    PROTEINTOT 6.8 09/20/2024    ALBUMIN 3.7 09/20/2024    BILITOT 0.8 09/20/2024    ALKPHOS 115 09/20/2024    AST 41 (H) 09/20/2024    ALT 31 09/20/2024        No results found for: \"IRON\", \"LABIRON\", \"TRANSFERRIN\", \"TIBC\"  No results found for: \"LDH\", \"FERRITIN\", \"OTBCEABL06\", \"FOLATE\"  No results found for: \"PSA\", \"CEA\", " "\"AFP\", \"\", \"\"    No Images in the past 120 days found..         Assessment and Plan:  Diagnoses and all orders for this visit:    1. Thrombocytopenia (Primary)  Assessment & Plan:  Patient's platelet count is mildly decreased but still >100,000.  The slight decrease in her platelet count may be related to underlying liver disease.  Consider GI evaluation for liver.  No intervention required with platelet count at this level.  She will return in 6 months with repeat CBC.  Follow-up with her PCP for lab work as scheduled.    Orders:  -     CBC & Differential; Future        Casper Chatterjee MD    10/7/2024        "

## 2024-10-07 NOTE — ASSESSMENT & PLAN NOTE
Patient's platelet count is mildly decreased but still >100,000.  The slight decrease in her platelet count may be related to underlying liver disease.  Consider GI evaluation for liver.  No intervention required with platelet count at this level.  She will return in 6 months with repeat CBC.  Follow-up with her PCP for lab work as scheduled.

## 2024-10-18 ENCOUNTER — FLU SHOT (OUTPATIENT)
Dept: FAMILY MEDICINE CLINIC | Facility: CLINIC | Age: 59
End: 2024-10-18
Payer: COMMERCIAL

## 2024-10-18 DIAGNOSIS — Z23 NEED FOR INFLUENZA VACCINATION: Primary | ICD-10-CM

## 2024-10-18 PROCEDURE — 90656 IIV3 VACC NO PRSV 0.5 ML IM: CPT | Performed by: NURSE PRACTITIONER

## 2024-10-18 PROCEDURE — 90471 IMMUNIZATION ADMIN: CPT | Performed by: NURSE PRACTITIONER

## 2025-01-15 ENCOUNTER — TELEPHONE (OUTPATIENT)
Dept: GASTROENTEROLOGY | Facility: CLINIC | Age: 60
End: 2025-01-15
Payer: COMMERCIAL

## 2025-01-15 NOTE — TELEPHONE ENCOUNTER
Attempted to contact patient for sooner appointment. No answer. LVM for pt to contact the office at my direct extension.

## 2025-01-15 NOTE — PROGRESS NOTES
Chief Complaint        Elevated Hepatic Enzymes and ELEVATED FERRATIN LEVELS, SUSPECTED CIRRHOSIS OF THE LIVER     History of Present Illness      Elias Baron is a 59 y.o. female who presents to [facility] as a new patient with a history of hepatic steatosis, NAFLD, suspected cirrhosis, thrombocytopenia and obesity.  [facility] that has progressively worsened.  She has been evaluated by hematologist.  Patient has also had a CT scan which displayed possible cirrhosis.  Patient is a diabetic.  Patient has recently added coffee and to her regimen and transition to the Mediterranean diet.  [facility] including labs displaying a negative smooth muscle, YAQUELIN, AMA, alpha 1 antitrypsin and ceruloplasmin.  Patient denies family history of liver disease or liver cancer.  Patient denies alcohol use.  Patient denies history of excessive Tylenol use, IV drug abuse, blood transfusions, exposure to hepatitis.  Patient denies abdominal swelling, leg swelling, jaundice, fever, nausea, vomiting, weight loss, night sweats, melena, hematochezia, hematemesis.    Most recent labs- 9/20/24    Colonoscopy: Review of the patient's most recent colonoscopy performed by Dr. Hernandez on 2/15/2016 normal colonoscopy    Results       Result Review :   The following data was reviewed by: FRIDA Thorne on 01/16/2025     CMP          6/5/2024    09:38 9/20/2024    11:23   CMP   Glucose 207  213    BUN 14  12    Creatinine 0.88  0.95    EGFR 74.9  68.3    Sodium 138  141    Potassium 4.5  4.5    Chloride 106  105    Calcium 9.3  9.5    Total Protein 6.3  6.8    Albumin 3.9  3.7    Globulin 2.4  3.1    Total Bilirubin 0.7  0.8    Alkaline Phosphatase 101  115    AST (SGOT) 36  41    ALT (SGPT) 29  31    Albumin/Globulin Ratio 1.6  1.2    BUN/Creatinine Ratio 15.9  12.6    Anion Gap 5.8  11.4      CBC           "6/5/2024    09:38 9/20/2024    11:23   CBC   WBC 5.60  4.87    RBC 4.38  4.39    Hemoglobin 14.0  13.9    Hematocrit 41.7  42.6    MCV 95.2  97.0    MCH 32.0  31.7    MCHC 33.6  32.6    RDW 12.9  12.0    Platelets 110  115        Iron Profile No results found for: \"IRON\", \"TIBC\", \"LABIRON\", \"TRANSFERRIN\"  Ferritin No results found for: \"FERRITIN\"         Past Medical History       Past Medical History:   Diagnosis Date    Allergic     Diabetes mellitus     Hypertension     Hypothyroidism        Past Surgical History:   Procedure Laterality Date    COLONOSCOPY  2016    JOINT REPLACEMENT      TUBAL ABDOMINAL LIGATION           Current Outpatient Medications:     fluticasone (FLONASE) 50 MCG/ACT nasal spray, Administer 1 spray into the nostril(s) as directed by provider Daily., Disp: , Rfl:     levothyroxine (SYNTHROID, LEVOTHROID) 75 MCG tablet, Synthroid 75 mcg oral tablet take 1 tablet (75 mcg) by oral route once daily on an empty stomach 30 minutes before breakfast   Active, Disp: , Rfl:     losartan (COZAAR) 25 MG tablet, , Disp: , Rfl:     metFORMIN ER (GLUCOPHAGE-XR) 500 MG 24 hr tablet, Take 1 tablet by mouth Daily., Disp: , Rfl:     pantoprazole (PROTONIX) 40 MG EC tablet, Take 1 tablet by mouth Daily. (Patient taking differently: Take 1 tablet by mouth As Needed.), Disp: , Rfl:     Semaglutide, 1 MG/DOSE, (Ozempic, 1 MG/DOSE,) 2 MG/1.5ML solution pen-injector, Inject 1 mg under the skin into the appropriate area as directed 1 (One) Time Per Week., Disp: , Rfl:     celecoxib (CeleBREX) 200 MG capsule, Take 1 capsule by mouth As Needed for Mild Pain., Disp: , Rfl:      Allergies   Allergen Reactions    Amoxicillin-Pot Clavulanate Diarrhea    Ciprofloxacin Rash    Empagliflozin Other (See Comments)     Other reaction(s): Genital infection    Glipizide Myalgia    Meperidine Diarrhea and Nausea And Vomiting    Metformin GI Intolerance     Other reaction(s): Gastroesophageal reflux disease    Nitrofurantoin Unknown " "- High Severity    Statins Nausea And Vomiting       Family History   Problem Relation Age of Onset    Diabetes Father     Colon cancer Maternal Grandmother     Diabetes Maternal Grandfather         Social History     Social History Narrative    Sedentary       Objective       Objective     Vital Signs:   /71 (BP Location: Right arm, Patient Position: Sitting, Cuff Size: Adult)   Pulse 89   Ht 170.2 cm (67.01\")   Wt 109 kg (239 lb 6.4 oz)   SpO2 97%   BMI 37.49 kg/m²     Body mass index is 37.49 kg/m².    Physical Exam  Constitutional:       Appearance: Normal appearance. She is obese.   Pulmonary:      Effort: Pulmonary effort is normal.   Neurological:      General: No focal deficit present.      Mental Status: She is alert and oriented to person, place, and time.   Psychiatric:         Mood and Affect: Mood normal.         Behavior: Behavior normal.              Assessment & Plan          Assessment and Plan    Diagnoses and all orders for this visit:    1. Thrombocytopenia (Primary)    2. Hepatic steatosis    3. NAFLD (nonalcoholic fatty liver disease)      59-year-old female presenting the office today  as a new patient with a history of hepatic steatosis, NAFLD, suspected cirrhosis, thrombocytopenia and obesity.  Patient will proceed with FibroScan as planned.  Patient has had liver workup to include a negative YAQUELIN, AMA, smooth muscle, alpha-1 antitrypsin and ceruloplasmin.  Patient will need ultrasound every 6 months for HCC screening.  Patient's current MELD score is an 8.  Patient will continue healthy lifestyle including Mediterranean diet and 1 to 3 cups of coffee per day.      General Recommendations:  - Abstain from alcohol and illicit substances which can accelerate progression of liver disease.  - Tobacco cessation  - Hepatitis A and B hepatitis vaccinations   - Pneumococcal, meningococcal, and zoster vaccinations  - Yearly influenza.   - Avoid narcotics, benzodiazepines, sedatives, or " sleeping medications (other than diphenhydramine) as these medications have potential to aggravate hepatic encephalopathy.   - Avoid ASA (including products containing ASA such as Excedrin) and NSAIDs (Including but not limited to Ibuprofen, Advil, Aleve, Motrin, Naproxen, Nuprin, and Goody powders). These products have the potential to decreased platelet adhesivenness, irritate the stomach, and reduce renal function in patients with liver disease.   - Limit Tylenol < 1,000 mg daily.      Portal hypertension screening:  -Will plan to schedule EGD to screen for esophageal varices at follow-up.     Hepatocellular carcinoma (HCC):  -The patient is aware of the increased risk of HCC. Schedule RUQ US with doppler and check AFP every 6 months for HCC surveillance.    Nutrition:  - Recommended diet: 35-40 kcal/kg/min with 1.2 - 1.5 g/kg/day of lean protein from poultry, fish and legumes, with at least 3 servings protein daily. Eat plenty of vegetables and fruits.  - Diet along with regular exercise as tolerated was recommended to decrease the muscle wasting which is associated with chronic liver disease.   -Mediterranean diet  -Coffee 1 to 3 cups/day  - Encouraged to be conscientious of dietary sodium intake. <2 grams per day.   - Check vitamin A and vitamin D and supplement as necessary.    Follow up:  - Office visit every 6 months with labs and an US.  - Return sooner should the need arise. Pt understands to call any time with questions or concerns.   - Counseled to call if jaundice, ascites, encephalopathy or bleeding develop.              Follow Up       Follow Up   Return in about 6 months (around 7/16/2025).  Patient was given instructions and counseling regarding her condition or for health maintenance advice. Please see specific information pulled into the AVS if appropriate.

## 2025-01-16 ENCOUNTER — OFFICE VISIT (OUTPATIENT)
Dept: GASTROENTEROLOGY | Facility: CLINIC | Age: 60
End: 2025-01-16
Payer: COMMERCIAL

## 2025-01-16 VITALS
HEART RATE: 89 BPM | HEIGHT: 67 IN | OXYGEN SATURATION: 97 % | DIASTOLIC BLOOD PRESSURE: 71 MMHG | SYSTOLIC BLOOD PRESSURE: 139 MMHG | WEIGHT: 239.4 LBS | BODY MASS INDEX: 37.57 KG/M2

## 2025-01-16 DIAGNOSIS — K76.0 HEPATIC STEATOSIS: ICD-10-CM

## 2025-01-16 DIAGNOSIS — K76.0 NAFLD (NONALCOHOLIC FATTY LIVER DISEASE): ICD-10-CM

## 2025-01-16 DIAGNOSIS — D69.6 THROMBOCYTOPENIA: Primary | ICD-10-CM

## 2025-01-16 RX ORDER — CELECOXIB 200 MG/1
200 CAPSULE ORAL AS NEEDED
COMMUNITY

## 2025-01-16 RX ORDER — METFORMIN HYDROCHLORIDE 500 MG/1
500 TABLET, EXTENDED RELEASE ORAL DAILY
COMMUNITY
Start: 2024-11-25

## 2025-01-17 ENCOUNTER — TELEPHONE (OUTPATIENT)
Dept: ONCOLOGY | Facility: HOSPITAL | Age: 60
End: 2025-01-17
Payer: COMMERCIAL

## 2025-01-17 ENCOUNTER — PATIENT ROUNDING (BHMG ONLY) (OUTPATIENT)
Dept: GASTROENTEROLOGY | Facility: CLINIC | Age: 60
End: 2025-01-17
Payer: COMMERCIAL

## 2025-01-17 NOTE — TELEPHONE ENCOUNTER
The pt called to report a abnormal WBC of 3.9. The [facility]. The pt was instructed by Dr Hdz's office to report the WBC to Dr Chatterjee. The labs are not scanned into the system. The pt states that she has a physical copy of the labs.

## 2025-01-17 NOTE — PROGRESS NOTES
"1/17/2025      Hello, may I speak with Elias Baron     My name is Nestor. I am calling from [facility] [place]. I show that you had a recent visit with FRIDA Thorne.    Before we get started may I verify your date of birth? 1965    I am calling to officially welcome you to our practice and ask about your recent visit. Is this a good time to talk? Yes    Tell me about your visit with us. What things went well? \"Everything went well. Sharon made me feel so much better!\"    We strive to ensure that we protect your safety and privacy. Is there anything we could have done to improve this during your visit?   No     We're always looking for ways to make our patients' experiences even better. Do you have recommendations on ways we may improve? No    Overall were you satisfied with your first visit to our practice? Yes    I appreciate you taking the time to speak with me today. Is there anything else I can do for you? No    I am glad to hear that you had a very good visit and I appreciate you taking the time to provide feedback on this call. We would greatly appreciate you filling out a survey if you receive one in the mail, email or text. This is a great opportunity to provide any additional feedback that you may think of after this call as well.       Thank you, and have a great day.  "

## 2025-01-17 NOTE — TELEPHONE ENCOUNTER
This nurse called the pt and relayed Dr Chatterjee's message. The pt voiced understanding. The pt also states that she will stop by the office with a copy of her labs to be scanned into her chart.

## 2025-02-27 ENCOUNTER — TELEPHONE (OUTPATIENT)
Dept: GASTROENTEROLOGY | Facility: CLINIC | Age: 60
End: 2025-02-27
Payer: COMMERCIAL

## 2025-02-27 NOTE — TELEPHONE ENCOUNTER
Patient called and reported she recently [facility]   The report has not been completed yet per patient but she did have the 2 numbers that you had asked for?    2.3 and 230

## 2025-02-28 NOTE — TELEPHONE ENCOUNTER
PATIENT CALLED AND I ADVISED HER THAT WE NEEDED THE PHYSICAL REPORT.    SHE STATED THAT AS SOON AS IT CAME TO HER [facility] MYCHART SHE WOULD BRING A COPY TO THE OFFICE.

## 2025-03-03 NOTE — TELEPHONE ENCOUNTER
Pt stopped by office to drop off [facility] report from [facility]. I have indexed into chart and routed to TAM Thorne

## 2025-03-03 NOTE — TELEPHONE ENCOUNTER
Spoke with the patient and informed her of the report from Sharon GALICIA:  Moderate/severe fatty liver noted on fibro scan. Recommend continue healthy diet and weight loss of at least 10%.     Patient to maintain her f/u on 7/17/2025.

## 2025-03-31 ENCOUNTER — LAB (OUTPATIENT)
Dept: LAB | Facility: HOSPITAL | Age: 60
End: 2025-03-31
Payer: COMMERCIAL

## 2025-03-31 DIAGNOSIS — D69.6 THROMBOCYTOPENIA: ICD-10-CM

## 2025-03-31 LAB
BASOPHILS # BLD AUTO: 0.05 10*3/MM3 (ref 0–0.2)
BASOPHILS NFR BLD AUTO: 1.1 % (ref 0–1.5)
DEPRECATED RDW RBC AUTO: 45.9 FL (ref 37–54)
EOSINOPHIL # BLD AUTO: 0.12 10*3/MM3 (ref 0–0.4)
EOSINOPHIL NFR BLD AUTO: 2.6 % (ref 0.3–6.2)
ERYTHROCYTE [DISTWIDTH] IN BLOOD BY AUTOMATED COUNT: 12.9 % (ref 12.3–15.4)
HCT VFR BLD AUTO: 39.9 % (ref 34–46.6)
HGB BLD-MCNC: 13.3 G/DL (ref 12–15.9)
IMM GRANULOCYTES # BLD AUTO: 0.01 10*3/MM3 (ref 0–0.05)
IMM GRANULOCYTES NFR BLD AUTO: 0.2 % (ref 0–0.5)
LYMPHOCYTES # BLD AUTO: 1.48 10*3/MM3 (ref 0.7–3.1)
LYMPHOCYTES NFR BLD AUTO: 31.9 % (ref 19.6–45.3)
MCH RBC QN AUTO: 32.1 PG (ref 26.6–33)
MCHC RBC AUTO-ENTMCNC: 33.3 G/DL (ref 31.5–35.7)
MCV RBC AUTO: 96.4 FL (ref 79–97)
MONOCYTES # BLD AUTO: 0.43 10*3/MM3 (ref 0.1–0.9)
MONOCYTES NFR BLD AUTO: 9.3 % (ref 5–12)
NEUTROPHILS NFR BLD AUTO: 2.55 10*3/MM3 (ref 1.7–7)
NEUTROPHILS NFR BLD AUTO: 54.9 % (ref 42.7–76)
NRBC BLD AUTO-RTO: 0 /100 WBC (ref 0–0.2)
PLATELET # BLD AUTO: 96 10*3/MM3 (ref 140–450)
PMV BLD AUTO: 10 FL (ref 6–12)
RBC # BLD AUTO: 4.14 10*6/MM3 (ref 3.77–5.28)
WBC NRBC COR # BLD AUTO: 4.64 10*3/MM3 (ref 3.4–10.8)

## 2025-03-31 PROCEDURE — 36415 COLL VENOUS BLD VENIPUNCTURE: CPT

## 2025-03-31 PROCEDURE — 85025 COMPLETE CBC W/AUTO DIFF WBC: CPT

## 2025-04-07 ENCOUNTER — OFFICE VISIT (OUTPATIENT)
Dept: ONCOLOGY | Facility: HOSPITAL | Age: 60
End: 2025-04-07
Payer: COMMERCIAL

## 2025-04-07 VITALS
TEMPERATURE: 97.3 F | OXYGEN SATURATION: 99 % | HEART RATE: 95 BPM | SYSTOLIC BLOOD PRESSURE: 126 MMHG | BODY MASS INDEX: 35.99 KG/M2 | RESPIRATION RATE: 18 BRPM | WEIGHT: 229.28 LBS | DIASTOLIC BLOOD PRESSURE: 78 MMHG | HEIGHT: 67 IN

## 2025-04-07 DIAGNOSIS — D69.6 THROMBOCYTOPENIA: Primary | ICD-10-CM

## 2025-04-07 PROCEDURE — 99213 OFFICE O/P EST LOW 20 MIN: CPT | Performed by: INTERNAL MEDICINE

## 2025-04-07 PROCEDURE — G0463 HOSPITAL OUTPT CLINIC VISIT: HCPCS | Performed by: INTERNAL MEDICINE

## 2025-04-07 NOTE — ASSESSMENT & PLAN NOTE
Related to splenomegaly from underlying liver disease.  Platelet count remains decreased but quite reasonable at 96.  No specific intervention is required.  Follow-up with gastroenterology regarding her liver.  I will see her back in 6 months with repeat CBC.

## 2025-04-07 NOTE — PROGRESS NOTES
Chief Complaint  Thrombocytopenia    Vi Tinoco APRN Ohler, Robyn Ann, APRN Subjective          Elias Baron presents to [facility] for follow-up of thrombocytopenia.  Since her last visit she was diagnosed with fatty liver.  She also has documented splenomegaly.  She denies excessive bruising or bleeding.  She has been watching her diet and is lost 10 pounds.  She denies fever, chills, weight loss, night sweats or adenopathy.    Oncology/Hematology History    No history exists.         Current Outpatient Medications on File Prior to Visit   Medication Sig Dispense Refill    celecoxib (CeleBREX) 200 MG capsule Take 1 capsule by mouth As Needed for Mild Pain.      fluticasone (FLONASE) 50 MCG/ACT nasal spray Administer 1 spray into the nostril(s) as directed by provider Daily.      levothyroxine (SYNTHROID, LEVOTHROID) 75 MCG tablet Synthroid 75 mcg oral tablet take 1 tablet (75 mcg) by oral route once daily on an empty stomach 30 minutes before breakfast   Active      losartan (COZAAR) 25 MG tablet       metFORMIN ER (GLUCOPHAGE-XR) 500 MG 24 hr tablet Take 1 tablet by mouth Daily.      pantoprazole (PROTONIX) 40 MG EC tablet Take 1 tablet by mouth Daily. (Patient taking differently: Take 1 tablet by mouth As Needed.)      Semaglutide, 1 MG/DOSE, (Ozempic, 1 MG/DOSE,) 2 MG/1.5ML solution pen-injector Inject 1 mg under the skin into the appropriate area as directed 1 (One) Time Per Week.       No current facility-administered medications on file prior to visit.       Allergies   Allergen Reactions    Amoxicillin-Pot Clavulanate Diarrhea    Ciprofloxacin Rash    Empagliflozin Other (See Comments)     Other reaction(s): Genital infection    Glipizide Myalgia    Meperidine Diarrhea and Nausea And Vomiting    Metformin GI Intolerance     Other reaction(s): Gastroesophageal reflux disease    Nitrofurantoin Unknown - High Severity    Statins Nausea And Vomiting     Past  "Medical History:   Diagnosis Date    Allergic     Diabetes mellitus     Hypertension     Hypothyroidism      Past Surgical History:   Procedure Laterality Date    COLONOSCOPY  2016    JOINT REPLACEMENT      TUBAL ABDOMINAL LIGATION       Social History     Socioeconomic History    Marital status:    Tobacco Use    Smoking status: Never    Smokeless tobacco: Never   Vaping Use    Vaping status: Never Used   Substance and Sexual Activity    Alcohol use: Never    Drug use: Never    Sexual activity: Defer     Family History   Problem Relation Age of Onset    Diabetes Father     Colon cancer Maternal Grandmother     Diabetes Maternal Grandfather        Objective   Physical Exam    Vitals:    04/07/25 1057   BP: 126/78   Pulse: 95   Resp: 18   Temp: 97.3 °F (36.3 °C)   TempSrc: Temporal   SpO2: 99%   Weight: 104 kg (229 lb 4.5 oz)   Height: 170.2 cm (67.01\")   PainSc: 0-No pain     ECOG score: 0         PHQ-9 Total Score:                    Result Review :   The following data was reviewed by: Casper Chatterjee MD on 04/07/2025:  Lab Results   Component Value Date    HGB 13.3 03/31/2025    HCT 39.9 03/31/2025    MCV 96.4 03/31/2025    PLT 96 (L) 03/31/2025    WBC 4.64 03/31/2025    NEUTROABS 2.55 03/31/2025    LYMPHSABS 1.48 03/31/2025    MONOSABS 0.43 03/31/2025    EOSABS 0.12 03/31/2025    BASOSABS 0.05 03/31/2025     Lab Results   Component Value Date    GLUCOSE 213 (H) 09/20/2024    BUN 12 09/20/2024    CREATININE 0.95 09/20/2024     09/20/2024    K 4.5 09/20/2024     09/20/2024    CO2 24.6 09/20/2024    CALCIUM 9.5 09/20/2024    PROTEINTOT 6.8 09/20/2024    ALBUMIN 3.7 09/20/2024    BILITOT 0.8 09/20/2024    ALKPHOS 115 09/20/2024    AST 41 (H) 09/20/2024    ALT 31 09/20/2024     No results found for: \"MG\", \"PHOS\", \"FREET4\", \"TSH\"  No results found for: \"IRON\", \"LABIRON\", \"TRANSFERRIN\", \"TIBC\"  No results found for: \"LDH\", \"FERRITIN\", \"DVBMRZTU39\", \"FOLATE\"  No results found for: \"PSA\", \"CEA\", " "\"AFP\", \"\", \"\"    Data reviewed : Radiologic studies FibroScan reviewed    Gastroenterology notes reviewed     Assessment and Plan    Diagnoses and all orders for this visit:    1. Thrombocytopenia (Primary)  Assessment & Plan:  Related to splenomegaly from underlying liver disease.  Platelet count remains decreased but quite reasonable at 96.  No specific intervention is required.  Follow-up with gastroenterology regarding her liver.  I will see her back in 6 months with repeat CBC.    Orders:  -     CBC & Differential; Future            Patient Follow Up: 6 months    Patient was given instructions and counseling regarding her condition or for health maintenance advice. Please see specific information pulled into the AVS if appropriate.     Casper Chatterjee MD    4/7/2025            "

## 2025-04-30 ENCOUNTER — OFFICE VISIT (OUTPATIENT)
Dept: FAMILY MEDICINE CLINIC | Facility: CLINIC | Age: 60
End: 2025-04-30
Payer: COMMERCIAL

## 2025-04-30 VITALS
WEIGHT: 235 LBS | OXYGEN SATURATION: 100 % | HEART RATE: 102 BPM | BODY MASS INDEX: 36.8 KG/M2 | DIASTOLIC BLOOD PRESSURE: 76 MMHG | SYSTOLIC BLOOD PRESSURE: 145 MMHG | TEMPERATURE: 98 F

## 2025-04-30 DIAGNOSIS — N39.0 URINARY TRACT INFECTION WITHOUT HEMATURIA, SITE UNSPECIFIED: Primary | ICD-10-CM

## 2025-04-30 DIAGNOSIS — R39.89 URETHRAL PAIN: ICD-10-CM

## 2025-04-30 LAB
BILIRUB BLD-MCNC: NEGATIVE MG/DL
CLARITY, POC: CLEAR
COLOR UR: ABNORMAL
EXPIRATION DATE: ABNORMAL
GLUCOSE UR STRIP-MCNC: NEGATIVE MG/DL
KETONES UR QL: NEGATIVE
LEUKOCYTE EST, POC: ABNORMAL
Lab: ABNORMAL
NITRITE UR-MCNC: NEGATIVE MG/ML
PH UR: 6 [PH] (ref 5–8)
PROT UR STRIP-MCNC: NEGATIVE MG/DL
RBC # UR STRIP: NEGATIVE /UL
SP GR UR: 1.03 (ref 1–1.03)
UROBILINOGEN UR QL: NORMAL

## 2025-04-30 PROCEDURE — 87086 URINE CULTURE/COLONY COUNT: CPT

## 2025-04-30 NOTE — PROGRESS NOTES
Chief Complaint   Patient presents with    Urinary Tract Infection     Burning while urinating   Symptoms for 1 month          Subjective     Elias Baron  has a past medical history of Allergic, Diabetes mellitus, Hypertension, and Hypothyroidism.    HPI    History of Present Illness  The patient is a 60-year-old female who presents today for urethral pain and burning with urination, which has been occurring for a month. Urine shows trace leukocytes.    Approximately one month ago, mild urethral discomfort was first noticed during a flight. Temporary relief was achieved through the consumption of cranberry juice. However, symptoms recurred three days ago. An inability to control urine flow, even with Kegel exercises, has been reported, along with an observed enlargement of the urethral opening per patient. Despite suspecting a urinary tract infection, no pain is experienced during urination. Additionally, an unusual orange discoloration in the urine has been observed. No previous consultations with a urologist for this issue have been made. There is no presence of blood or abnormal discharge in the urine.    Allergies   Allergen Reactions    Amoxicillin-Pot Clavulanate Diarrhea    Ciprofloxacin Rash    Empagliflozin Other (See Comments)     Other reaction(s): Genital infection    Glipizide Myalgia    Meperidine Diarrhea and Nausea And Vomiting    Metformin GI Intolerance     Other reaction(s): Gastroesophageal reflux disease    Nitrofurantoin Unknown - High Severity    Statins Nausea And Vomiting         Current Outpatient Medications:     celecoxib (CeleBREX) 200 MG capsule, Take 1 capsule by mouth As Needed for Mild Pain., Disp: , Rfl:     fluticasone (FLONASE) 50 MCG/ACT nasal spray, Administer 1 spray into the nostril(s) as directed by provider Daily., Disp: , Rfl:     levothyroxine (SYNTHROID, LEVOTHROID) 75 MCG tablet, Synthroid 75 mcg oral tablet take 1 tablet (75 mcg) by oral route once daily on an empty  stomach 30 minutes before breakfast   Active, Disp: , Rfl:     losartan (COZAAR) 25 MG tablet, , Disp: , Rfl:     metFORMIN ER (GLUCOPHAGE-XR) 500 MG 24 hr tablet, Take 1 tablet by mouth Daily., Disp: , Rfl:     pantoprazole (PROTONIX) 40 MG EC tablet, Take 1 tablet by mouth Daily. (Patient taking differently: Take 1 tablet by mouth As Needed.), Disp: , Rfl:     Semaglutide, 1 MG/DOSE, (Ozempic, 1 MG/DOSE,) 2 MG/1.5ML solution pen-injector, Inject 1 mg under the skin into the appropriate area as directed 1 (One) Time Per Week., Disp: , Rfl:     Patient Active Problem List   Diagnosis    Benign breast cyst in female, right    Hypothyroidism    Hypertension    Thrombocytopenia        Past Surgical History:   Procedure Laterality Date    COLONOSCOPY  2016    JOINT REPLACEMENT      TUBAL ABDOMINAL LIGATION         Social History     Socioeconomic History    Marital status:    Tobacco Use    Smoking status: Never    Smokeless tobacco: Never   Vaping Use    Vaping status: Never Used   Substance and Sexual Activity    Alcohol use: Never    Drug use: Never    Sexual activity: Defer       Family History   Problem Relation Age of Onset    Diabetes Father     Colon cancer Maternal Grandmother     Diabetes Maternal Grandfather        Family history, surgical history, past medical history, Allergies and med's reviewed with patient today and updated in [employer] EMR.     ROS:  Review of Systems   Respiratory: Negative.     Cardiovascular: Negative.    Genitourinary:  Positive for dysuria and frequency.       OBJECTIVE:  Vitals:    04/30/25 1321 04/30/25 1325   BP: 154/85 145/76   Pulse: 102    Temp: 98 °F (36.7 °C)    TempSrc: Oral    SpO2: 100%    Weight: 107 kg (235 lb)      Body mass index is 36.8 kg/m².  No LMP recorded. Patient has had an ablation.    Physical Exam  Cardiovascular:      Rate and Rhythm: Normal rate.   Pulmonary:      Effort: Pulmonary effort is normal.   Neurological:      General: No focal deficit  present.      Mental Status: She is oriented to person, place, and time. Mental status is at baseline.         Physical Exam  Genitourinary: Normal external appearance, no masses, lesions, or discharge    Procedures           Health Maintenance Due   Topic Date Due    PAP SMEAR  07/01/2024    ANNUAL PHYSICAL  03/05/2025        No visits with results within 30 Day(s) from this visit.   Latest known visit with results is:   Lab on 03/31/2025   Component Date Value Ref Range Status    WBC 03/31/2025 4.64  3.40 - 10.80 10*3/mm3 Final    RBC 03/31/2025 4.14  3.77 - 5.28 10*6/mm3 Final    Hemoglobin 03/31/2025 13.3  12.0 - 15.9 g/dL Final    Hematocrit 03/31/2025 39.9  34.0 - 46.6 % Final    MCV 03/31/2025 96.4  79.0 - 97.0 fL Final    MCH 03/31/2025 32.1  26.6 - 33.0 pg Final    MCHC 03/31/2025 33.3  31.5 - 35.7 g/dL Final    RDW 03/31/2025 12.9  12.3 - 15.4 % Final    RDW-SD 03/31/2025 45.9  37.0 - 54.0 fl Final    MPV 03/31/2025 10.0  6.0 - 12.0 fL Final    Platelets 03/31/2025 96 (L)  140 - 450 10*3/mm3 Final    Neutrophil % 03/31/2025 54.9  42.7 - 76.0 % Final    Lymphocyte % 03/31/2025 31.9  19.6 - 45.3 % Final    Monocyte % 03/31/2025 9.3  5.0 - 12.0 % Final    Eosinophil % 03/31/2025 2.6  0.3 - 6.2 % Final    Basophil % 03/31/2025 1.1  0.0 - 1.5 % Final    Immature Grans % 03/31/2025 0.2  0.0 - 0.5 % Final    Neutrophils, Absolute 03/31/2025 2.55  1.70 - 7.00 10*3/mm3 Final    Lymphocytes, Absolute 03/31/2025 1.48  0.70 - 3.10 10*3/mm3 Final    Monocytes, Absolute 03/31/2025 0.43  0.10 - 0.90 10*3/mm3 Final    Eosinophils, Absolute 03/31/2025 0.12  0.00 - 0.40 10*3/mm3 Final    Basophils, Absolute 03/31/2025 0.05  0.00 - 0.20 10*3/mm3 Final    Immature Grans, Absolute 03/31/2025 0.01  0.00 - 0.05 10*3/mm3 Final    nRBC 03/31/2025 0.0  0.0 - 0.2 /100 WBC Final       Results  Labs   - Urinalysis: Trace leukocytes      ASSESSMENT/ PLAN:    Diagnoses and all orders for this visit:    1. Urethral pain (Primary)    2.  Urinary tract infection without hematuria, site unspecified  -     POCT urinalysis dipstick, automated        Assessment & Plan  1. Urethral discomfort.  - Reports urethral pain and burning with urination for about a month, with symptoms worsening over the past three days.  - Urine analysis shows trace leukocytes.  - Urine culture will be conducted to rule out the presence of a urinary tract infection; results expected by 05/02/2025.  - Referral to urology for further evaluation and management; appropriate antibiotic treatment will be initiated if the urine culture is positive.    Orders Placed Today:     No orders of the defined types were placed in this encounter.       Management Plan:     An After Visit Summary was printed and given to the patient at discharge.    Follow-up: Return if symptoms worsen or fail to improve.    Patient or patient representative verbalized consent for the use of Ambient Listening during the visit with  FRIDA Stinson for chart documentation. 4/30/2025  14:46 EDT    FRIDA Stinson 4/30/2025 13:37 EDT  This note was electronically signed.

## 2025-05-01 LAB — BACTERIA SPEC AEROBE CULT: NO GROWTH

## 2025-06-02 NOTE — PROGRESS NOTES
Chief Complaint: unusual urethra opening and new patient    Patient or patient representative verbalized consent for the use of Ambient Listening during the visit with  FRIDA Goddard for chart documentation. 6/3/2025  13:14 EDT    Subjective       History of Present Illness  Elias Baron is a 60 y.o. female presents to [facility] to be seen for urethral pain.    She reports that she has had swelling around the urethral meatus and occasional pain in the urethra since March.  She initially suspected a urinary tract infection (UTI), which she managed with cranberry juice during a trip to [place]. Upon her return, she sought medical attention from her primary care physician, who conducted a urine culture that yielded normal results. She reports an inability to control her urine flow at times. She also experiences occasional sharp stabbing pains in the urethra with pain radiating down her legs when urinating, which ceases once her bladder is empty. She has observed an enlargement of her urethra, resembling a life-preserving ring or a doughnut, and suspects swelling as the cause of her discomfort. She describes the area as puffy and tight, to the urethra and even around the vaginal tissues, to the point where tampon insertion would be challenging. She reports no vaginal discharge or itching but does experience intermittent sharp pain in her urethra, akin to a knife stab. She has been diagnosed with cystitis in the past and was treated for it. She reports no urinary incontinence or bedwetting, and no hematuria. She has no history of kidney stones or surgeries related to the urinary tract. [facility] with no abnormalities reported on recent exams.  She reports that A recent transvaginal ultrasound was performed, which showed no abnormalities.     She is diabetic and takes Ozempic for it. She was started on metformin 6 months ago with the  Ozempic because her numbers were at 145 for her sugar level. Now, they are down in the 125s and her A1c is 6.5. She will go back next week for more labs.    She takes losartan for her blood pressure.    She takes medication for her thyroid.    She has GERD and takes a proton pump inhibitor for it. She also chews Tums at night.    Supplemental Information  She had a FibroScan from her liver and was told she had fatty liver stage 2-3. She had low platelets and sees Dr. Nam for that.    FAMILY HISTORY  She does not report any family history of bladder or kidney cancer.    MEDICATIONS  Current: Ozempic, losartan, metformin    Urinary symptoms/history:   Frequency-denies     Urgency-denies     Incontinence-denies     Nocturia-denies     GH-denies     Dysuria-denies     History of stones-denies      surgeries-denies     Family history of  malignancy-denies     Cardiopulmonary-HTN, DM    Anticoagulants-none     Smoker-denies     Urine culture  2025 no growth    Objective     Past Medical History:   Diagnosis Date    Allergic     Diabetes mellitus     Fatty liver 2025    GERD (gastroesophageal reflux disease)     Hypertension     Hypothyroidism        Past Surgical History:   Procedure Laterality Date     SECTION      COLONOSCOPY  2016    JOINT REPLACEMENT      TUBAL ABDOMINAL LIGATION           Current Outpatient Medications:     amoxicillin (AMOXIL) 500 MG capsule, take 4 capsules by mouth one hour prior to dental appointment, Disp: , Rfl:     celecoxib (CeleBREX) 200 MG capsule, Take 1 capsule by mouth As Needed for Mild Pain., Disp: , Rfl:     fluticasone (FLONASE) 50 MCG/ACT nasal spray, Administer 1 spray into the nostril(s) as directed by provider Daily., Disp: , Rfl:     levothyroxine (SYNTHROID, LEVOTHROID) 75 MCG tablet, Synthroid 75 mcg oral tablet take 1 tablet (75 mcg) by oral route once daily on an empty stomach 30 minutes before breakfast   Active, Disp: , Rfl:     losartan  "(COZAAR) 25 MG tablet, , Disp: , Rfl:     metFORMIN ER (GLUCOPHAGE-XR) 500 MG 24 hr tablet, Take 1 tablet by mouth Daily., Disp: , Rfl:     pantoprazole (PROTONIX) 40 MG EC tablet, Take 1 tablet by mouth Daily. (Patient taking differently: Take 1 tablet by mouth As Needed.), Disp: , Rfl:     Semaglutide, 1 MG/DOSE, (Ozempic, 1 MG/DOSE,) 2 MG/1.5ML solution pen-injector, Inject 1 mg under the skin into the appropriate area as directed 1 (One) Time Per Week., Disp: , Rfl:     estradiol (ESTRACE) 0.1 MG/GM vaginal cream, Apply a pea sized amount and rub into the external genitalia, around the vaginal opening and over the urethra twice a week at bedtime., Disp: 42.5 g, Rfl: 11    Allergies   Allergen Reactions    Amoxicillin-Pot Clavulanate Diarrhea    Ciprofloxacin Rash    Empagliflozin Other (See Comments)     Other reaction(s): Genital infection    Glipizide Myalgia    Meperidine Diarrhea and Nausea And Vomiting    Metformin GI Intolerance     Other reaction(s): Gastroesophageal reflux disease    Nitrofurantoin Unknown - High Severity    Statins Nausea And Vomiting        Family History   Problem Relation Age of Onset    Diabetes Father     Colon cancer Maternal Grandmother     Diabetes Maternal Grandfather     Diabetes Brother        Social History     Socioeconomic History    Marital status:    Tobacco Use    Smoking status: Never    Smokeless tobacco: Never   Vaping Use    Vaping status: Never Used   Substance and Sexual Activity    Alcohol use: Never    Drug use: Never    Sexual activity: Yes     Partners: Male     Birth control/protection: Post-menopausal, Tubal ligation       Vital Signs:   Resp 12   Ht 170.2 cm (67.01\")   Wt 105 kg (231 lb)   BMI 36.17 kg/m²      Physical Exam  Vitals and nursing note reviewed.   Constitutional:       General: She is not in acute distress.     Appearance: Normal appearance. She is not toxic-appearing.   Pulmonary:      Effort: Pulmonary effort is normal. No " respiratory distress.   Genitourinary:     Exam position: Knee-chest position.      Pubic Area: No rash.       Labia:         Right: No rash, tenderness or lesion.         Left: No rash, tenderness or lesion.       Urethra: Urethral swelling present. No prolapse, urethral pain or urethral lesion.       Skin:     General: Skin is warm and dry.      Findings: No bruising, erythema or rash.   Neurological:      General: No focal deficit present.      Mental Status: She is alert and oriented to person, place, and time.      Gait: Gait normal.          Result Review :   The following data was reviewed by: FRIDA Goddard on 06/03/2025:  Results for orders placed or performed in visit on 06/03/25   Bladder Scan    Collection Time: 06/03/25 12:57 PM   Result Value Ref Range    Urine Volume 0      Bladder Scan interpretation 06/03/2025    Estimation of residual urine via BVI 3000 [address] Bladder Scan  MA/nurse performing: HUMAIRA Honeycutt  Residual Urine: 0 ml  Indication: Urethral caruncle    Urethral pain    Renal cyst   Position: Supine  Examination: Incremental scanning of the suprapubic area using 2.0 MHz transducer using copious amounts of acoustic gel.   Findings: An anechoic area was demonstrated which represented the bladder, with measurement of residual urine as noted. I inspected this myself. In that the residual urine was stable or insignificant, refer to plan for treatment and plan necessary at this time.            Results  Laboratory Studies  Urine culture showed no growth.    Imaging  CT scan done on December 11, 2024 showed no swelling or hydronephrosis of the kidney or the ureter, no masses on the kidney or stones within the kidney, and a 3.5 cm simple cyst on the left kidney. Bladder appeared normal but underdistended.  Transvaginal ultrasound showed no abnormalities.        Procedures        Assessment and Plan    Diagnoses and all orders for this visit:    1. Urethral caruncle (Primary)  -     estradiol  (ESTRACE) 0.1 MG/GM vaginal cream; Apply a pea sized amount and rub into the external genitalia, around the vaginal opening and over the urethra twice a week at bedtime.  Dispense: 42.5 g; Refill: 11    2. Urethral pain  -     Bladder Scan  -     [facility] Guidance UTI - Urine, Clean Catch; Future    3. Renal cyst        Assessment & Plan  1. Urethral pain.  The urethral tissue appears enlarged and there is a small caruncle noted.  A prescription for estrogen cream has been provided, to be applied externally around the urethra twice weekly at bedtime.  A urine sample will be collected today for further analysis.  The patient will be contacted with these results once available and treated as appropriate.  A CT scan conducted on December 11, 2024, revealed no significant findings except for a 3.5 cm simple cyst on the left kidney, which is not a cause for concern. The bladder was slightly underdistended, likely due to emptiness at the time of the scan. If symptoms worsen, she is advised to contact the office immediately.  We discussed that if she had worsening or persistent symptoms we can consider scheduling her for cystoscopy.        Follow-up  The patient will follow up in 2 months or sooner if needed for any worsening symptoms or new concerns.      Follow Up   Return in about 2 months (around 8/3/2025).  Patient was given instructions and counseling regarding her condition or for health maintenance advice. Please see specific information pulled into the AVS if appropriate.         This document has been electronically signed by FRIDA Goddard  Rosa Isela 3, 2025 13:42 EDT

## 2025-06-03 ENCOUNTER — OFFICE VISIT (OUTPATIENT)
Dept: UROLOGY | Age: 60
End: 2025-06-03
Payer: COMMERCIAL

## 2025-06-03 VITALS — BODY MASS INDEX: 36.26 KG/M2 | WEIGHT: 231 LBS | HEIGHT: 67 IN | RESPIRATION RATE: 12 BRPM

## 2025-06-03 DIAGNOSIS — N36.2 URETHRAL CARUNCLE: Primary | ICD-10-CM

## 2025-06-03 DIAGNOSIS — R39.89 URETHRAL PAIN: ICD-10-CM

## 2025-06-03 DIAGNOSIS — N28.1 RENAL CYST: ICD-10-CM

## 2025-06-03 LAB — URINE VOLUME: 0

## 2025-06-03 PROCEDURE — 99213 OFFICE O/P EST LOW 20 MIN: CPT | Performed by: NURSE PRACTITIONER

## 2025-06-03 RX ORDER — ESTRADIOL 0.1 MG/G
CREAM VAGINAL
Qty: 42.5 G | Refills: 11 | Status: SHIPPED | OUTPATIENT
Start: 2025-06-03

## 2025-06-03 RX ORDER — AMOXICILLIN 500 MG/1
CAPSULE ORAL
COMMUNITY
Start: 2025-05-16

## 2025-06-06 ENCOUNTER — TELEPHONE (OUTPATIENT)
Dept: UROLOGY | Age: 60
End: 2025-06-06
Payer: COMMERCIAL

## 2025-06-06 DIAGNOSIS — N39.0 ACUTE UTI: Primary | ICD-10-CM

## 2025-06-06 DIAGNOSIS — B96.89 BV (BACTERIAL VAGINOSIS): ICD-10-CM

## 2025-06-06 DIAGNOSIS — N76.0 BV (BACTERIAL VAGINOSIS): ICD-10-CM

## 2025-06-06 RX ORDER — GRANULES FOR ORAL 3 G/1
3 POWDER ORAL EVERY OTHER DAY
Qty: 6 G | Refills: 0 | Status: SHIPPED | OUTPATIENT
Start: 2025-06-06 | End: 2025-06-09

## 2025-06-06 RX ORDER — FLUCONAZOLE 150 MG/1
150 TABLET ORAL
Qty: 2 TABLET | Refills: 0 | Status: SHIPPED | OUTPATIENT
Start: 2025-06-06

## 2025-06-06 RX ORDER — METRONIDAZOLE 7.5 MG/G
1 GEL VAGINAL NIGHTLY
Qty: 1 EACH | Refills: 0 | Status: SHIPPED | OUTPATIENT
Start: 2025-06-06 | End: 2025-06-11

## 2025-06-06 NOTE — TELEPHONE ENCOUNTER
Please let the patient know that I did go ahead and send in a prescription for Diflucan per her request.  Thank you.

## 2025-06-06 NOTE — TELEPHONE ENCOUNTER
Called patient reviewed results of [facility] test.  Patient is positive for UTI, BV and Ureaplasma.  Will treat with fosfomycin and metronidazole gel.  If she has any worsening or persistent symptoms, she should let us know.  She verbalizes understanding and agrees with care.

## 2025-07-07 ENCOUNTER — TELEPHONE (OUTPATIENT)
Dept: GASTROENTEROLOGY | Facility: CLINIC | Age: 60
End: 2025-07-07
Payer: COMMERCIAL

## 2025-07-07 NOTE — TELEPHONE ENCOUNTER
Called and spoke with patient regarding their appointment with Leslie on 7/17/2025 at 1pm. Patient is agreeable and has been rescheduled to 7/24/2025 at 945am.

## 2025-07-24 ENCOUNTER — OFFICE VISIT (OUTPATIENT)
Dept: GASTROENTEROLOGY | Facility: CLINIC | Age: 60
End: 2025-07-24
Payer: COMMERCIAL

## 2025-07-24 VITALS
WEIGHT: 237.3 LBS | SYSTOLIC BLOOD PRESSURE: 126 MMHG | HEIGHT: 67 IN | DIASTOLIC BLOOD PRESSURE: 72 MMHG | HEART RATE: 100 BPM | BODY MASS INDEX: 37.25 KG/M2

## 2025-07-24 DIAGNOSIS — Z86.39 HISTORY OF DIET-CONTROLLED DIABETES: ICD-10-CM

## 2025-07-24 DIAGNOSIS — K74.60 CIRRHOSIS OF LIVER WITHOUT ASCITES, UNSPECIFIED HEPATIC CIRRHOSIS TYPE: ICD-10-CM

## 2025-07-24 DIAGNOSIS — K75.81 NASH (NONALCOHOLIC STEATOHEPATITIS): Primary | ICD-10-CM

## 2025-07-24 DIAGNOSIS — D69.6 THROMBOCYTOPENIA: ICD-10-CM

## 2025-07-24 PROCEDURE — 99214 OFFICE O/P EST MOD 30 MIN: CPT | Performed by: NURSE PRACTITIONER

## 2025-07-24 RX ORDER — GRANULES FOR ORAL 3 G/1
POWDER ORAL
COMMUNITY
Start: 2025-06-09

## 2025-07-24 NOTE — PROGRESS NOTES
Chief Complaint   Fatty Liver     History of Present Illness       Elias Baron is a 60 y.o. female who presents to [facility] for follow-up         History of Present Illness  The patient is a 60-year-old female who presents today for an office follow-up. She has a history of nonalcoholic fatty liver disease, suspected cirrhosis, thrombocytopenia, and obesity. She was last seen in the office by nurse practitioner Sharon Robertson in 01/2025. [facility] She is a type II diabetic. Her most recent colonoscopy was done by Dr. Watts in 02/2016 and it was normal at that time. She had an abdominal ultrasound and hepatic Doppler done [facility], which showed cirrhosis with no focal liver lesions, mild splenomegaly, and no ascites. The Doppler was normal.    She reports feeling well until [facility], where she was informed of her liver disease status. She has been following a Mediterranean diet and has started drinking coffee, even though she is not a regular coffee drinker. She is uncertain about her liver condition, as she was told she is close to developing cirrhosis. Her spleen is slightly enlarged due to fatty liver disease.    Her primary care physician noted low platelet counts, but the cause remains unknown. Her oncologist, Dr. Dow, also could not determine the cause of her low platelet count. Her platelet count has been decreasing every 6 months but is now stable at around 85,000 to 100,000. When her platelet count dropped below 100,000, [facility], which she underwent. Her MELD score was reported as 8.    She is a type 2 diabetic and is currently on Ozempic 1.5 mg, with plans to increase the dose to 2 mg. She also takes metformin to help lower her blood sugar levels, which were previously in the 160s and 180s even with the injection. She is part of a weight loss group  "through [employer] and has a pharmaceutical nurse assisting her.    She experienced severe GERD symptoms 5 months ago, which were managed with a proton pump inhibitor. She takes pantoprazole daily after her thyroid medication, waiting 30 minutes to an hour before taking it.    SOCIAL HISTORY  Diet: The patient follows a Mediterranean diet.  Coffee/Tea/Caffeine-containing Drinks: The patient started drinking coffee.        Results       Result Review :       CMP          9/20/2024    11:23   CMP   Glucose 213    BUN 12    Creatinine 0.95    EGFR 68.3    Sodium 141    Potassium 4.5    Chloride 105    Calcium 9.5    Total Protein 6.8    Albumin 3.7    Globulin 3.1    Total Bilirubin 0.8    Alkaline Phosphatase 115    AST (SGOT) 41    ALT (SGPT) 31    Albumin/Globulin Ratio 1.2    BUN/Creatinine Ratio 12.6    Anion Gap 11.4      CBC          9/20/2024    11:23 3/31/2025    13:26   CBC   WBC 4.87  4.64    RBC 4.39  4.14    Hemoglobin 13.9  13.3    Hematocrit 42.6  39.9    MCV 97.0  96.4    MCH 31.7  32.1    MCHC 32.6  33.3    RDW 12.0  12.9    Platelets 115  96      CBC w/diff          9/20/2024    11:23 3/31/2025    13:26   CBC w/Diff   WBC 4.87  4.64    RBC 4.39  4.14    Hemoglobin 13.9  13.3    Hematocrit 42.6  39.9    MCV 97.0  96.4    MCH 31.7  32.1    MCHC 32.6  33.3    RDW 12.0  12.9    Platelets 115  96    Neutrophil Rel % 56.3  54.9    Immature Granulocyte Rel % 0.2  0.2    Lymphocyte Rel % 29.6  31.9    Monocyte Rel % 9.4  9.3    Eosinophil Rel % 3.7  2.6    Basophil Rel % 0.8  1.1          Electrolytes          9/20/2024    11:23   Electrolytes   Sodium 141    Potassium 4.5    Chloride 105    Calcium 9.5      Renal Profile          9/20/2024    11:23   Renal Profile   BUN 12    Creatinine 0.95      BMP          9/20/2024    11:23   BMP   BUN 12    Creatinine 0.95    Sodium 141    Potassium 4.5    Chloride 105    CO2 24.6    Calcium 9.5            Lipase No results found for: \"LIPASE\"  Amylase No results found for: " "\"AMYLASE\"  Iron Profile No results found for: \"IRON\", \"TIBC\", \"LABIRON\", \"TRANSFERRIN\"  Ferritin No results found for: \"FERRITIN\"  ESR (Sed Rate) No results found for: \"SEDRATE\"  CRP (C-Reactive) No results found for: \"CRP\"  Liver Workup No results found for: \"AFPTM\", \"DSDNA\", \"EXPANDEDENA\", \"SMOOTHMUSCAB\", \"CERULOPLSM\", \"FERRITIN\", \"LABIMMURE\", \"TOTIGGREF\", \"IGA\", \"IGM\", \"IRON\", \"TIBC\", \"LABIRON\", \"TRANSFERRIN\", \"MITOAB\", \"PROTIME\", \"INR\", \"AFP\"  Acute HEP Panel No results found for: \"HEPBSAG\", \"HEPAIGM\", \"HEPBIGMCORE\", \"HEPCVIRUSABY\"  Alpha 1 No results found for: \"AFPTM\"  YAQUELIN No results found for: \"DSDNA\", \"EXPANDEDENA\"  ASMA No results found for: \"SMOOTHMUSCAB\"  AFP No results found for: \"AFP\"             Results  Labs   - Hemoglobin A1c: 2025, 6.5%   - Complete Blood Count (CBC): 2025, Hemoglobin 13.7 g/dL, White count 3.9 x10^3/uL, Platelets 100 x10^3/uL   - Liver Function Test: 2025, ALT 31 U/L, AST 37 U/L, Alkaline Phosphatase 87 U/L, Total Bilirubin 1.2 mg/dL   - Blood Glucose Test: 2025, Glucose 176 mg/dL   - Basic Metabolic Panel: 2025, Creatinine 0.78 mg/dL   - Liver Function Test: 2025, Total Bilirubin 0.96 mg/dL, Alkaline Phosphatase 91 U/L, AST 37 U/L, ALT 29 U/L    Imaging   - Abdominal Ultrasound: 2025, Cirrhosis, no focal liver lesions, mild splenomegaly, no ascites   - Hepatic Doppler: 2025, Normal      Past Medical History       Past Medical History:   Diagnosis Date    Allergic     Diabetes mellitus     Fatty liver 2025    GERD (gastroesophageal reflux disease)     Hypertension     Hypothyroidism        Past Surgical History:   Procedure Laterality Date     SECTION      COLONOSCOPY      JOINT REPLACEMENT      TUBAL ABDOMINAL LIGATION           Current Outpatient Medications:     celecoxib (CeleBREX) 200 MG capsule, Take 1 capsule by mouth As Needed for Mild Pain., Disp: , Rfl:     estradiol (ESTRACE) 0.1 MG/GM vaginal cream, " Apply a pea sized amount and rub into the external genitalia, around the vaginal opening and over the urethra twice a week at bedtime., Disp: 42.5 g, Rfl: 11    fluconazole (DIFLUCAN) 150 MG tablet, Take 1 tablet by mouth Every 72 (Seventy-Two) Hours As Needed (Symptoms of yeast infection including vaginal itching or discharge)., Disp: 2 tablet, Rfl: 0    fluticasone (FLONASE) 50 MCG/ACT nasal spray, Administer 1 spray into the nostril(s) as directed by provider Daily., Disp: , Rfl:     fosfomycin (MONUROL) 3 g pack, TAKE 1 PACKET BY MOUTH EVERY OTHER DAY FOR 2 DOSES, Disp: , Rfl:     levothyroxine (SYNTHROID, LEVOTHROID) 75 MCG tablet, Synthroid 75 mcg oral tablet take 1 tablet (75 mcg) by oral route once daily on an empty stomach 30 minutes before breakfast   Active, Disp: , Rfl:     losartan (COZAAR) 25 MG tablet, , Disp: , Rfl:     metFORMIN ER (GLUCOPHAGE-XR) 500 MG 24 hr tablet, Take 1 tablet by mouth Daily., Disp: , Rfl:     pantoprazole (PROTONIX) 40 MG EC tablet, Take 1 tablet by mouth Daily. (Patient taking differently: Take 1 tablet by mouth As Needed.), Disp: , Rfl:     Semaglutide, 1 MG/DOSE, (Ozempic, 1 MG/DOSE,) 2 MG/1.5ML solution pen-injector, Inject 1 mg under the skin into the appropriate area as directed 1 (One) Time Per Week., Disp: , Rfl:      Allergies   Allergen Reactions    Amoxicillin-Pot Clavulanate Diarrhea    Ciprofloxacin Rash    Empagliflozin Other (See Comments)     Other reaction(s): Genital infection    Glipizide Myalgia    Meperidine Diarrhea and Nausea And Vomiting    Metformin GI Intolerance     Other reaction(s): Gastroesophageal reflux disease    Nitrofurantoin Unknown - High Severity    Statins Nausea And Vomiting       Family History   Problem Relation Age of Onset    Diabetes Father     Colon cancer Maternal Grandmother     Diabetes Maternal Grandfather     Diabetes Brother         Social History     Social History Narrative    Sedentary       Objective       Review of  "Systems   Constitutional:  Negative for appetite change, fatigue, fever, unexpected weight gain and unexpected weight loss.   HENT:  Negative for trouble swallowing.    Respiratory:  Negative for cough, choking, chest tightness, shortness of breath, wheezing and stridor.    Cardiovascular:  Negative for chest pain, palpitations and leg swelling.   Gastrointestinal:  Negative for abdominal distention, abdominal pain, anal bleeding, blood in stool, constipation, diarrhea, nausea, rectal pain, vomiting, GERD and indigestion.        Vital Signs:   /72 (BP Location: Left arm, Patient Position: Sitting, Cuff Size: Adult)   Pulse 100   Ht 170.2 cm (67\")   Wt 108 kg (237 lb 4.8 oz)   BMI 37.17 kg/m²       Physical Exam  Constitutional:       General: She is not in acute distress.     Appearance: She is well-developed. She is not ill-appearing.   HENT:      Head: Normocephalic.   Eyes:      Pupils: Pupils are equal, round, and reactive to light.   Cardiovascular:      Rate and Rhythm: Normal rate and regular rhythm.      Heart sounds: Normal heart sounds.   Pulmonary:      Effort: Pulmonary effort is normal.      Breath sounds: Normal breath sounds.   Abdominal:      General: Bowel sounds are normal. There is no distension.      Palpations: Abdomen is soft. There is no mass.      Tenderness: There is no abdominal tenderness. There is no guarding or rebound.      Hernia: No hernia is present.   Musculoskeletal:         General: Normal range of motion.   Skin:     General: Skin is warm and dry.   Neurological:      Mental Status: She is alert and oriented to person, place, and time.   Psychiatric:         Speech: Speech normal.         Behavior: Behavior normal.         Judgment: Judgment normal.         Physical Exam          Assessment & Plan          Assessment and Plan    Diagnoses and all orders for this visit:    1. MONROY (nonalcoholic steatohepatitis) (Primary)    2. Cirrhosis of liver without ascites, " unspecified hepatic cirrhosis type    3. History of diet-controlled diabetes    4. Thrombocytopenia        Assessment & Plan  1. Cirrhosis:  - MELD score is currently 8, indicating a stable condition.  - Liver enzymes have shown improvement.  - Abdominal ultrasound confirmed cirrhosis with no focal liver lesions or ascites detected.  - Mild splenomegaly noted.  - Continue current treatment regimen.  - Maintain Mediterranean diet.  - Emphasize the importance of regular monitoring.    2. Thrombocytopenia:  - Platelet count stable at around 100,000.  - Spleen enlargement attributed to liver disease.  - No specific treatment required for spleen enlargement at this moment.    3. Type II Diabetes Mellitus:  - Most recent hemoglobin A1c was 6.5 on 03/05/2025.  - Currently on Ozempic 1.5 mg and metformin.  - Discussed increasing Ozempic to 2 mg for potential benefits to diabetes and liver condition.  - Continue current medications.  - Monitor blood glucose levels closely.    4. Gastroesophageal Reflux Disease (GERD):  - Currently taking pantoprazole daily, which has been effective.  - Continue taking pantoprazole as prescribed.    5. Health Maintenance:  - Due for a screening colonoscopy next year, as it has been 10 years since the last one.  - Recommended to have an upper endoscopy at the same time to evaluate the stomach, esophagus, and small bowel.    Follow-up: A follow-up visit is scheduled in 6 months.          Follow Up       Follow Up   Return in about 6 months (around 1/24/2026) for FATTY LIVER, ELEVATED LIVER ENZYMES, GERD.  Patient was given instructions and counseling regarding her condition or for health maintenance advice. Please see specific information pulled into the AVS if appropriate.       Patient or patient representative verbalized consent for the use of Ambient Listening during the visit with  FRIDA Mckay for chart documentation. 7/24/2025  11:31 EDT

## 2025-07-29 NOTE — PROGRESS NOTES
Chief Complaint: urethral  opening issues    Patient or patient representative verbalized consent for the use of Ambient Listening during the visit with  FRIDA Goddard for chart documentation. 7/31/2025  13:03 EDT    Subjective         History of Present Illness  Elias Baron is a 60 y.o. female presents to [facility] to be seen for follow-up.    The patient was last seen in the office on 6/3/2025 for her urethral caruncle, urethral pain and renal cysts.  At that time the patient was started on vaginal estrogen cream.  A pathnostics test was sent to the lab and did come back positive for multiple types of bacteria.  She was treated with fosfomycin and metronidazole gel.  We discussed that the renal cyst did not require any routine follow-up.  The patient is here today to follow-up.    She reports an improvement in her condition, although she notes that the vaginal opening remains large. She has been performing Kegel exercises to manage this issue. She expresses concern about the possibility of cancer but reports no current symptoms of infection. During her last visit, a culture was performed which returned positive results for several types of bacteria. She was subsequently treated with antibiotics and fosfomycin, which she found effective.    Pathnostics  6/3/2025 greater than 100,000 CFU/mL Aerococcus urinae, greater than 100,000 CFU/mL Gardnerella vaginalis, greater than 100,000 CFU/mL viridans group strep, 50,000-99,999 CFU/mL Actinotignum schaalii, 50,000-99,999 CFU/mL Ureaplasma urealyticum, less than 10,000 CFU/mL E. coli    Previous visit 6/3/2025:  Elias Baron is a 60 y.o. female presents to [facility] to be seen for urethral pain.     She reports that she has had swelling around the urethral meatus and occasional pain in the urethra since March.  She initially suspected a urinary tract infection (UTI), which she managed with cranberry juice  during a trip to [place]. Upon her return, she sought medical attention from her primary care physician, who conducted a urine culture that yielded normal results. She reports an inability to control her urine flow at times. She also experiences occasional sharp stabbing pains in the urethra with pain radiating down her legs when urinating, which ceases once her bladder is empty. She has observed an enlargement of her urethra, resembling a life-preserving ring or a doughnut, and suspects swelling as the cause of her discomfort. She describes the area as puffy and tight, to the urethra and even around the vaginal tissues, to the point where tampon insertion would be challenging. She reports no vaginal discharge or itching but does experience intermittent sharp pain in her urethra, akin to a knife stab. She has been diagnosed with cystitis in the past and was treated for it. She reports no urinary incontinence or bedwetting, and no hematuria. She has no history of kidney stones or surgeries related to the urinary tract. [facility] with no abnormalities reported on recent exams.  She reports that A recent transvaginal ultrasound was performed, which showed no abnormalities.      She is diabetic and takes Ozempic for it. She was started on metformin 6 months ago with the Ozempic because her numbers were at 145 for her sugar level. Now, they are down in the 125s and her A1c is 6.5. She will go back next week for more labs.     She takes losartan for her blood pressure.     She takes medication for her thyroid.     She has GERD and takes a proton pump inhibitor for it. She also chews Tums at night.     Supplemental Information  She had a FibroScan from her liver and was told she had fatty liver stage 2-3. She had low platelets and sees Dr. Nam for that.     FAMILY HISTORY  She does not report any family history of bladder or kidney cancer.     MEDICATIONS  Current:  Ozempic, losartan, metformin     Urinary symptoms/history:   Frequency-denies      Urgency-denies      Incontinence-denies      Nocturia-denies      GH-denies      Dysuria-denies      History of stones-denies       surgeries-denies      Family history of  malignancy-denies      Cardiopulmonary-HTN, DM     Anticoagulants-none      Smoker-denies      Urine culture  2025 no growth       Objective     Past Medical History:   Diagnosis Date    Allergic     Diabetes mellitus     Fatty liver 2025    GERD (gastroesophageal reflux disease)     Hypertension     Hypothyroidism        Past Surgical History:   Procedure Laterality Date     SECTION      COLONOSCOPY      JOINT REPLACEMENT      TUBAL ABDOMINAL LIGATION           Current Outpatient Medications:     celecoxib (CeleBREX) 200 MG capsule, Take 1 capsule by mouth As Needed for Mild Pain., Disp: , Rfl:     estradiol (ESTRACE) 0.1 MG/GM vaginal cream, Apply a pea sized amount and rub into the external genitalia, around the vaginal opening and over the urethra twice a week at bedtime., Disp: 42.5 g, Rfl: 11    fluconazole (DIFLUCAN) 150 MG tablet, Take 1 tablet by mouth Every 72 (Seventy-Two) Hours As Needed (Symptoms of yeast infection including vaginal itching or discharge)., Disp: 2 tablet, Rfl: 0    fluticasone (FLONASE) 50 MCG/ACT nasal spray, Administer 1 spray into the nostril(s) as directed by provider Daily., Disp: , Rfl:     fosfomycin (MONUROL) 3 g pack, TAKE 1 PACKET BY MOUTH EVERY OTHER DAY FOR 2 DOSES, Disp: , Rfl:     levothyroxine (SYNTHROID, LEVOTHROID) 75 MCG tablet, Synthroid 75 mcg oral tablet take 1 tablet (75 mcg) by oral route once daily on an empty stomach 30 minutes before breakfast   Active, Disp: , Rfl:     losartan (COZAAR) 25 MG tablet, , Disp: , Rfl:     metFORMIN ER (GLUCOPHAGE-XR) 500 MG 24 hr tablet, Take 1 tablet by mouth Daily., Disp: , Rfl:     pantoprazole (PROTONIX) 40 MG EC tablet, Take 1 tablet by mouth  "Daily. (Patient taking differently: Take 1 tablet by mouth As Needed.), Disp: , Rfl:     Semaglutide, 1 MG/DOSE, (Ozempic, 1 MG/DOSE,) 2 MG/1.5ML solution pen-injector, Inject 1 mg under the skin into the appropriate area as directed 1 (One) Time Per Week., Disp: , Rfl:     Allergies   Allergen Reactions    Amoxicillin-Pot Clavulanate Diarrhea    Ciprofloxacin Rash    Empagliflozin Other (See Comments)     Other reaction(s): Genital infection    Glipizide Myalgia    Meperidine Diarrhea and Nausea And Vomiting    Metformin GI Intolerance     Other reaction(s): Gastroesophageal reflux disease    Nitrofurantoin Unknown - High Severity    Statins Nausea And Vomiting        Family History   Problem Relation Age of Onset    Diabetes Father     Colon cancer Maternal Grandmother     Diabetes Maternal Grandfather     Diabetes Brother        Social History     Socioeconomic History    Marital status:    Tobacco Use    Smoking status: Never    Smokeless tobacco: Never   Vaping Use    Vaping status: Never Used   Substance and Sexual Activity    Alcohol use: Never    Drug use: Never    Sexual activity: Yes     Partners: Male     Birth control/protection: Post-menopausal, Tubal ligation       Vital Signs:   Resp 18   Ht 170.2 cm (67\")   Wt 106 kg (233 lb)   BMI 36.49 kg/m²      Physical Exam  Vitals and nursing note reviewed.   Constitutional:       General: She is not in acute distress.     Appearance: Normal appearance. She is not toxic-appearing.   Pulmonary:      Effort: Pulmonary effort is normal. No respiratory distress.   Neurological:      General: No focal deficit present.      Mental Status: She is alert and oriented to person, place, and time.          Result Review :   The following data was reviewed by: FRIDA Goddard on 07/31/2025:  Results for orders placed or performed in visit on 07/31/25   Bladder Scan    Collection Time: 07/31/25 12:56 PM   Result Value Ref Range    Volume: 0 ML       Bladder Scan " interpretation 07/31/2025    Estimation of residual urine via BVI 3000 [address] Bladder Scan  MA/nurse performing: BECKI Armas  Residual Urine: 0 ml  Indication: Urethral caruncle    Renal cyst   Position: Supine  Examination: Incremental scanning of the suprapubic area using 2.0 MHz transducer using copious amounts of acoustic gel.   Findings: An anechoic area was demonstrated which represented the bladder, with measurement of residual urine as noted. I inspected this myself. In that the residual urine was stable or insignificant, refer to plan for treatment and plan necessary at this time.         Results  Labs   - Culture: Positive for several different types of bacteria        Procedures        Assessment and Plan    Diagnoses and all orders for this visit:    1. Urethral caruncle (Primary)  -     Bladder Scan    2. Renal cyst        Assessment & Plan    Continued use of topical estrogen cream was advised to aid in the healing process, which may take several months. The patient was reassured that there is very little, if any, systemic absorption with topical use, and thus, no significant risk of cancer associated with it, despite the black box warning on all estrogen products. The patient was encouraged to remain patient as improvement may take up to three months. No symptoms of infection were reported during this visit. The patient was informed that the current provider will be available until mid-October 2025, after which care will be transferred to another provider, likely one of the nurse practitioners. The patient was advised to continue with the application of the estrogen cream and to monitor for any symptoms of infection.    Follow-up  A follow-up appointment is scheduled for 2 months from now, around the end of September or early October 2025.      Follow Up   Return in about 2 months (around 9/30/2025).  Patient was given instructions and counseling regarding her condition or for health maintenance  advice. Please see specific information pulled into the AVS if appropriate.         This document has been electronically signed by FRIDA Goddard  July 31, 2025 13:19 EDT

## 2025-07-31 ENCOUNTER — OFFICE VISIT (OUTPATIENT)
Dept: UROLOGY | Age: 60
End: 2025-07-31
Payer: COMMERCIAL

## 2025-07-31 VITALS — BODY MASS INDEX: 36.57 KG/M2 | WEIGHT: 233 LBS | RESPIRATION RATE: 18 BRPM | HEIGHT: 67 IN

## 2025-07-31 DIAGNOSIS — N28.1 RENAL CYST: ICD-10-CM

## 2025-07-31 DIAGNOSIS — N36.2 URETHRAL CARUNCLE: Primary | ICD-10-CM

## 2025-07-31 LAB — SPECIMEN VOL SMN: NORMAL ML

## 2025-07-31 PROCEDURE — 99213 OFFICE O/P EST LOW 20 MIN: CPT | Performed by: NURSE PRACTITIONER
